# Patient Record
Sex: MALE | Race: BLACK OR AFRICAN AMERICAN | NOT HISPANIC OR LATINO | Employment: OTHER | URBAN - METROPOLITAN AREA
[De-identification: names, ages, dates, MRNs, and addresses within clinical notes are randomized per-mention and may not be internally consistent; named-entity substitution may affect disease eponyms.]

---

## 2018-02-08 ENCOUNTER — APPOINTMENT (OUTPATIENT)
Dept: CT IMAGING | Facility: CLINIC | Age: 73
End: 2018-02-08
Attending: EMERGENCY MEDICINE
Payer: COMMERCIAL

## 2018-02-08 ENCOUNTER — APPOINTMENT (OUTPATIENT)
Dept: GENERAL RADIOLOGY | Facility: CLINIC | Age: 73
End: 2018-02-08
Attending: EMERGENCY MEDICINE
Payer: COMMERCIAL

## 2018-02-08 ENCOUNTER — HOSPITAL ENCOUNTER (OUTPATIENT)
Facility: CLINIC | Age: 73
Setting detail: OBSERVATION
Discharge: HOME | End: 2018-02-09
Attending: EMERGENCY MEDICINE | Admitting: FAMILY MEDICINE
Payer: COMMERCIAL

## 2018-02-08 DIAGNOSIS — R79.89 ELEVATED LACTIC ACID LEVEL: ICD-10-CM

## 2018-02-08 DIAGNOSIS — R93.1 REGIONAL WALL MOTION ABNORMALITY OF HEART: ICD-10-CM

## 2018-02-08 DIAGNOSIS — I50.9 CHF (CONGESTIVE HEART FAILURE) (H): ICD-10-CM

## 2018-02-08 DIAGNOSIS — R35.0 URINARY FREQUENCY: ICD-10-CM

## 2018-02-08 DIAGNOSIS — R79.89: ICD-10-CM

## 2018-02-08 DIAGNOSIS — M43.6 NECK STIFFNESS: ICD-10-CM

## 2018-02-08 DIAGNOSIS — N40.1 BENIGN PROSTATIC HYPERPLASIA WITH LOWER URINARY TRACT SYMPTOMS, SYMPTOM DETAILS UNSPECIFIED: ICD-10-CM

## 2018-02-08 DIAGNOSIS — N39.0 UTI (URINARY TRACT INFECTION): ICD-10-CM

## 2018-02-08 DIAGNOSIS — I50.32 CHRONIC DIASTOLIC CONGESTIVE HEART FAILURE (H): Primary | ICD-10-CM

## 2018-02-08 DIAGNOSIS — M43.6 CONTRACTURE OF NECK: ICD-10-CM

## 2018-02-08 DIAGNOSIS — I50.9 CONGESTIVE HEART FAILURE (H): ICD-10-CM

## 2018-02-08 LAB
ALBUMIN SERPL-MCNC: 3.1 G/DL (ref 3.4–5)
ALBUMIN UR-MCNC: NEGATIVE MG/DL
ALP SERPL-CCNC: 85 U/L (ref 40–150)
ALT SERPL W P-5'-P-CCNC: 30 U/L (ref 0–70)
ANION GAP SERPL CALCULATED.3IONS-SCNC: 7 MMOL/L (ref 3–14)
APPEARANCE UR: CLEAR
AST SERPL W P-5'-P-CCNC: ABNORMAL U/L (ref 0–45)
BASOPHILS # BLD AUTO: 0 10E9/L (ref 0–0.2)
BASOPHILS NFR BLD AUTO: 1.1 %
BILIRUB SERPL-MCNC: 0.8 MG/DL (ref 0.2–1.3)
BILIRUB UR QL STRIP: NEGATIVE
BUN SERPL-MCNC: 22 MG/DL (ref 7–30)
CALCIUM SERPL-MCNC: 8.9 MG/DL (ref 8.5–10.1)
CHLORIDE SERPL-SCNC: 109 MMOL/L (ref 94–109)
CO2 SERPL-SCNC: 26 MMOL/L (ref 20–32)
COLOR UR AUTO: NORMAL
CREAT SERPL-MCNC: 1.32 MG/DL (ref 0.66–1.25)
DIFFERENTIAL METHOD BLD: ABNORMAL
EOSINOPHIL # BLD AUTO: 0.1 10E9/L (ref 0–0.7)
EOSINOPHIL NFR BLD AUTO: 2.5 %
ERYTHROCYTE [DISTWIDTH] IN BLOOD BY AUTOMATED COUNT: 14 % (ref 10–15)
FLUAV+FLUBV AG SPEC QL: NEGATIVE
FLUAV+FLUBV AG SPEC QL: NEGATIVE
GFR SERPL CREATININE-BSD FRML MDRD: 53 ML/MIN/1.7M2
GLUCOSE SERPL-MCNC: 104 MG/DL (ref 70–99)
GLUCOSE UR STRIP-MCNC: NEGATIVE MG/DL
HCT VFR BLD AUTO: 38.9 % (ref 40–53)
HGB BLD-MCNC: 12.8 G/DL (ref 13.3–17.7)
HGB UR QL STRIP: NEGATIVE
IMM GRANULOCYTES # BLD: 0 10E9/L (ref 0–0.4)
IMM GRANULOCYTES NFR BLD: 0.3 %
KETONES UR STRIP-MCNC: NEGATIVE MG/DL
LACTATE BLD-SCNC: 3.2 MMOL/L (ref 0.7–2)
LEUKOCYTE ESTERASE UR QL STRIP: NEGATIVE
LYMPHOCYTES # BLD AUTO: 1.4 10E9/L (ref 0.8–5.3)
LYMPHOCYTES NFR BLD AUTO: 39.6 %
MCH RBC QN AUTO: 30.1 PG (ref 26.5–33)
MCHC RBC AUTO-ENTMCNC: 32.9 G/DL (ref 31.5–36.5)
MCV RBC AUTO: 92 FL (ref 78–100)
MONOCYTES # BLD AUTO: 0.5 10E9/L (ref 0–1.3)
MONOCYTES NFR BLD AUTO: 14.3 %
NEUTROPHILS # BLD AUTO: 1.5 10E9/L (ref 1.6–8.3)
NEUTROPHILS NFR BLD AUTO: 42.2 %
NITRATE UR QL: NEGATIVE
NRBC # BLD AUTO: 0 10*3/UL
NRBC BLD AUTO-RTO: 0 /100
PH UR STRIP: 5 PH (ref 5–7)
PLATELET # BLD AUTO: 157 10E9/L (ref 150–450)
POTASSIUM SERPL-SCNC: 4.8 MMOL/L (ref 3.4–5.3)
PROT SERPL-MCNC: 8.1 G/DL (ref 6.8–8.8)
RBC # BLD AUTO: 4.25 10E12/L (ref 4.4–5.9)
SODIUM SERPL-SCNC: 142 MMOL/L (ref 133–144)
SOURCE: NORMAL
SP GR UR STRIP: 1.01 (ref 1–1.03)
SPECIMEN SOURCE: NORMAL
UROBILINOGEN UR STRIP-MCNC: NORMAL MG/DL (ref 0–2)
WBC # BLD AUTO: 3.6 10E9/L (ref 4–11)

## 2018-02-08 PROCEDURE — 82945 GLUCOSE OTHER FLUID: CPT | Performed by: EMERGENCY MEDICINE

## 2018-02-08 PROCEDURE — 81003 URINALYSIS AUTO W/O SCOPE: CPT | Performed by: EMERGENCY MEDICINE

## 2018-02-08 PROCEDURE — 83605 ASSAY OF LACTIC ACID: CPT | Performed by: EMERGENCY MEDICINE

## 2018-02-08 PROCEDURE — 85025 COMPLETE CBC W/AUTO DIFF WBC: CPT | Performed by: EMERGENCY MEDICINE

## 2018-02-08 PROCEDURE — 99284 EMERGENCY DEPT VISIT MOD MDM: CPT | Mod: 25 | Performed by: EMERGENCY MEDICINE

## 2018-02-08 PROCEDURE — 87070 CULTURE OTHR SPECIMN AEROBIC: CPT | Performed by: EMERGENCY MEDICINE

## 2018-02-08 PROCEDURE — 87186 SC STD MICRODIL/AGAR DIL: CPT | Performed by: EMERGENCY MEDICINE

## 2018-02-08 PROCEDURE — 84157 ASSAY OF PROTEIN OTHER: CPT | Performed by: EMERGENCY MEDICINE

## 2018-02-08 PROCEDURE — 70450 CT HEAD/BRAIN W/O DYE: CPT

## 2018-02-08 PROCEDURE — 87077 CULTURE AEROBIC IDENTIFY: CPT | Performed by: EMERGENCY MEDICINE

## 2018-02-08 PROCEDURE — 25000128 H RX IP 250 OP 636: Performed by: EMERGENCY MEDICINE

## 2018-02-08 PROCEDURE — 25000132 ZZH RX MED GY IP 250 OP 250 PS 637: Performed by: EMERGENCY MEDICINE

## 2018-02-08 PROCEDURE — 99285 EMERGENCY DEPT VISIT HI MDM: CPT | Mod: 25 | Performed by: EMERGENCY MEDICINE

## 2018-02-08 PROCEDURE — 80053 COMPREHEN METABOLIC PANEL: CPT | Performed by: EMERGENCY MEDICINE

## 2018-02-08 PROCEDURE — 71046 X-RAY EXAM CHEST 2 VIEWS: CPT

## 2018-02-08 PROCEDURE — 62270 DX LMBR SPI PNXR: CPT | Mod: Z6 | Performed by: EMERGENCY MEDICINE

## 2018-02-08 PROCEDURE — 87205 SMEAR GRAM STAIN: CPT | Performed by: EMERGENCY MEDICINE

## 2018-02-08 PROCEDURE — 62270 DX LMBR SPI PNXR: CPT | Performed by: EMERGENCY MEDICINE

## 2018-02-08 PROCEDURE — 87804 INFLUENZA ASSAY W/OPTIC: CPT | Mod: 91 | Performed by: EMERGENCY MEDICINE

## 2018-02-08 RX ORDER — ACETAMINOPHEN 325 MG/1
650 TABLET ORAL ONCE
Status: COMPLETED | OUTPATIENT
Start: 2018-02-08 | End: 2018-02-08

## 2018-02-08 RX ORDER — LIDOCAINE HYDROCHLORIDE 10 MG/ML
INJECTION, SOLUTION EPIDURAL; INFILTRATION; INTRACAUDAL; PERINEURAL
Status: DISCONTINUED
Start: 2018-02-08 | End: 2018-02-08 | Stop reason: HOSPADM

## 2018-02-08 RX ORDER — AMPICILLIN 2 G/1
2 INJECTION, POWDER, FOR SOLUTION INTRAVENOUS ONCE
Status: COMPLETED | OUTPATIENT
Start: 2018-02-08 | End: 2018-02-09

## 2018-02-08 RX ADMIN — ACETAMINOPHEN 650 MG: 325 TABLET, FILM COATED ORAL at 20:50

## 2018-02-08 NOTE — IP AVS SNAPSHOT
MRN:[id number]                      After Visit Summary   2/8/2018    Farhad Godoy    MRN: [id number]           Thank you!     Thank you for choosing [facility] for your care. Our goal is always to provide you with excellent care. Hearing back from our patients is one way we can continue to improve our services. Please take a few minutes to complete the written survey that you may receive in the mail after you visit with us. Thank you!        Patient Information     Date Of Birth          1945        About your hospital stay     You were admitted on:  February 9, 2018 You last received care in the:  [address] [facility]    You were discharged on:  February 9, 2018        Reason for your hospital stay       You were admitted for concern of an infection causing your headache and some lab abnormalities. We checked for an infection and everything looked normal so you were okay to go home.                  Who to Call     For medical emergencies, please call 911.  For non-urgent questions about your medical care, please call your primary care provider or clinic, [phone]          Attending Provider     Provider Specialty    Carmelo Chung MD Emergency Medicine    [facility] Health[facility]am rodney MD Family Practice    Darline Laird DO Family Practice       Primary Care Provider Office Phone # Fax #    [facility] [phone]      After Care Instructions     Activity       Your activity upon discharge: activity as tolerated            Diet       Follow this diet upon discharge: Regular                  Follow-up Appointments     Adult [facility] Follow-up and recommended labs and tests       Follow up with primary care provider, [facility], within 3-4 days for hospital follow- up.     Appointments on [place] and/or [place] (with [facility] or [facility] provider or service). Call [phone] if you haven't heard regarding these appointments within  7 days of discharge.                  Additional Services     CARDIOLOGY EVAL ADULT REFERRAL       Please be aware that coverage of these services is subject to the terms and limitations of your health insurance plan.  Call member services at your health plan with any benefit or coverage questions.      Reason for referral: New wall motion abnormality (subtle inferoseptal hypokinesis) seen on echo in comparison to last echo in 2014.    Please bring the following to your appointment:  Any x-rays, CTs or MRIs which have been performed. Contact the facility where they were done to arrange for  prior to your scheduled appointment.    List of current medications  This referral request   Any documents/labs given to you for this referral    Additional information including address information can be found on the web at:  [place] [phone] [email or web address]/ldwudpms-afehmlm-dsztmariah  [place] [phone] [email or web address]://[email or web address]/[email or web address] [phone] [email or web address]/locations/[facility] [phone] [email or web address]/locations/[facility] [phone] https://[email or web address]/locations/[facility] [phone]  [email or web address]://[email or web address]/locations/[facility]/iwnzkhjf-ocaqdt-azcfikrnm-center  [place] [phone]  https://[email or web address]/locations/[facility]/xfjjnqjg-ngffbsvyk-dlggivzoB[facility] [phone]  [email or web address]://[email or web address]/locations/[facility]/clinics-and-surgery-center  [place] [phone]    [email or web address]                  Pending Results     Date and Time Order Name Status Description    2/9/2018 0959 Blood culture Preliminary     2/9/2018 0959 Blood culture Preliminary     2/9/2018 0543 EKG 12-lead, tracing only Preliminary      "2018 0543 Blood Morphology Pathologist Review In process     2018 2145 CSF Culture Aerobic Bacterial Preliminary             Statement of Approval     Ordered          18 1417  I have reviewed and agree with all the recommendations and orders detailed in this document.  EFFECTIVE NOW     Approved and electronically signed by:  Konrad Jon DO           18 1248  I have reviewed and agree with all the recommendations and orders detailed in this document.  EFFECTIVE NOW     Approved and electronically signed by:  Felicity Jauregui MD             Admission Information     Date & Time Provider Department Dept. Phone    2018 Darline Laird DO [address] [facility] [place] [phone]      Your Vitals Were     Blood Pressure Pulse Temperature Respirations Weight Pulse Oximetry    109/60 (BP Location: Right arm) 60 98.7  F (37.1  C) (Oral) 18 76.8 kg (169 lb 5 oz) 97%    BMI (Body Mass Index)                   23.61 kg/m2           MyChart Information     "[employer] lets you send messages to your doctor, view your test results, renew your prescriptions, schedule appointments and more. To sign up, go to [email or web address] . Click on \"Log in\" on the left side of the screen, which will take you to the Welcome page. Then click on \"Sign up Now\" on the right side of the page.     You will be asked to enter the access code listed below, as well as some personal information. Please follow the directions to create your username and password.     Your access code is: [email or web address]  Expires: 5/10/2018  1:51 PM     Your access code will  in 90 days. If you need help or a new code, please call your [place] clinic or [phone].        Care EveryWhere ID     This is your Care EveryWhere ID. This could be used by other organizations to access your [place] medical records  [id number]        Equal Access to Services     AKIN MOSLEY AH: Lauren Recio, emily james, sandra crowe " lyndsay maykimmy hickey'aan ah. So [facility] [phone].    ATENCIÓN: Si mariamla margarita, tiene a du disposición servicios gratuitos de asistencia lingüística. Joseluis al [phone].    We comply with applicable federal civil rights laws and [place] laws. We do not discriminate on the basis of race, color, national origin, age, disability, sex, sexual orientation, or gender identity.               Review of your medicines      CONTINUE these medicines which may have CHANGED, or have new prescriptions. If we are uncertain of the size of tablets/capsules you have at home, strength may be listed as something that might have changed.        Dose / Directions    furosemide 20 MG tablet   Commonly known as:  LASIX   This may have changed:  when to take this   Used for:  Chronic diastolic congestive heart failure (H)        Dose:  20 mg   Take 1 tablet (20 mg) by mouth daily   Quantity:  30 tablet   Refills:  1       tamsulosin 0.4 MG capsule   Commonly known as:  FLOMAX   This may have changed:  medication strength   Used for:  Benign prostatic hyperplasia with lower urinary tract symptoms, symptom details unspecified        Dose:  0.4 mg   Take 1 capsule (0.4 mg) by mouth daily   Quantity:  60 capsule   Refills:  0         CONTINUE these medicines which have NOT CHANGED        Dose / Directions    acetaminophen 325 MG tablet   Commonly known as:  TYLENOL   Used for:  Contracture of neck        Dose:  650 mg   Take 2 tablets (650 mg) by mouth every 6 hours as needed for mild pain or fever   Quantity:  30 tablet   Refills:  0       carvedilol 3.125 MG tablet   Commonly known as:  COREG   Used for:  Chronic diastolic congestive heart failure (H)        Dose:  3.125 mg   Take 1 tablet (3.125 mg) by mouth 2 times daily   Quantity:  60 tablet   Refills:  0       lisinopril 5 MG tablet   Commonly known as:  PRINIVIL/ZESTRIL   Used for:  Chronic hepatitis C (H), CHF (congestive heart failure) (H)        Dose:   5 mg   Take 5 mg by mouth daily   Refills:  0       polyethylene glycol 0.4%- propylene glycol 0.3% 0.4-0.3 % Soln ophthalmic solution   Commonly known as:  SYSTANE ULTRA        Dose:  1 drop   Place 1 drop into both eyes every hour as needed for dry eyes   Refills:  0       sildenafil 50 MG tablet   Commonly known as:  VIAGRA   Used for:  ED (erectile dysfunction)        Dose:  50 mg   Take 1 tablet (50 mg) by mouth daily as needed for erectile dysfunction   Quantity:  8 tablet   Refills:  1            Where to get your medicines      These medications were sent to [facility] - [place], [place] - [address]  [address] [facility] [address]     Phone:  [phone]     acetaminophen 325 MG tablet    carvedilol 3.125 MG tablet    furosemide 20 MG tablet    tamsulosin 0.4 MG capsule                Protect others around you: Learn how to safely use, store and throw away your medicines at www.disposemymeds.org.             Medication List: This is a list of all your medications and when to take them. Check marks below indicate your daily home schedule. Keep this list as a reference.      Medications           Morning Afternoon Evening Bedtime As Needed    acetaminophen 325 MG tablet   Commonly known as:  TYLENOL   Take 2 tablets (650 mg) by mouth every 6 hours as needed for mild pain or fever   Last time this was given:  650 mg on 2/9/2018  6:30 AM                                carvedilol 3.125 MG tablet   Commonly known as:  COREG   Take 1 tablet (3.125 mg) by mouth 2 times daily   Last time this was given:  3.125 mg on 2/9/2018  7:51 AM                                furosemide 20 MG tablet   Commonly known as:  LASIX   Take 1 tablet (20 mg) by mouth daily   Last time this was given:  20 mg on 2/9/2018  7:51 AM                                lisinopril 5 MG tablet   Commonly known as:  PRINIVIL/ZESTRIL   Take 5 mg by mouth daily                                polyethylene glycol 0.4%- propylene glycol 0.3%  0.4-0.3 % Soln ophthalmic solution   Commonly known as:  SYSTANE ULTRA   Place 1 drop into both eyes every hour as needed for dry eyes                                sildenafil 50 MG tablet   Commonly known as:  VIAGRA   Take 1 tablet (50 mg) by mouth daily as needed for erectile dysfunction                                tamsulosin 0.4 MG capsule   Commonly known as:  FLOMAX   Take 1 capsule (0.4 mg) by mouth daily   Last time this was given:  0.4 mg on 2/9/2018  7:51 AM

## 2018-02-08 NOTE — LETTER
Transition Communication Hand-off for Care Transitions to Next Level of Care Provider    Name: Farhad Godoy  MRN #: [id number]  Primary Care Provider: [facility]     Primary Clinic: [address] [place] [address]     Reason for Hospitalization:   Urinary frequency [R35.0]  Neck stiffness [M43.6]  Elevated lactic acid level [R79.89]    Admit Date/Time: 2/8/2018  8:05 PM  Discharge Date: 2/9/2018    Payor Source: Payor: UCARE / Plan: [employer] FOR S[employer]ORS [employer]ERS / Product Type: HMO /     Follow-up plan:  Future Appointments  Date Time Provider Department [place]   2/9/2018 2:30 PM Karin Eason [facility]   2/10/2018 11:45 AM Scooter Swanson [facility]   2/11/2018 12:00 PM Scooter Swanson [facility]   2/12/2018 10:00 AM Dori Dawn [facility]   2/13/2018 10:00 AM Dori Dawn [facility]       Key Recommendations:  Please review AVS    Vero DUNCANN RN PHN  Patient Care Management Coordinator  D[facility] [place] [address], and [facility] [address]  Phone: [phone] / Pager: [phone]      AVS/Discharge Summary is the source of truth; this is a helpful guide for improved communication of patient story

## 2018-02-08 NOTE — IP AVS SNAPSHOT
[address] [address]    Phone:  [phone]                                       After Visit Summary   2/8/2018    Farhad Godoy    MRN: [id number]           After Visit Summary Signature Page     I have received my discharge instructions, and my questions have been answered. I have discussed any challenges I see with this plan with the nurse or doctor.    ..........................................................................................................................................  Patient/Patient Representative Signature      ..........................................................................................................................................  Patient Representative Print Name and Relationship to Patient    ..................................................               ................................................  Date                                            Time    ..........................................................................................................................................  Reviewed by Signature/Title    ...................................................              ..............................................  Date                                                            Time

## 2018-02-08 NOTE — LETTER
February 15, 2018      Farhad Mooney  [address] [address]        Dear Farhad,     I am sorry we have not been able to reach you by phone. It is very important that you see a doctor because your spinal fluid test was positive for bacteria.     Your results need further followup. Please call the clinic to make an appointment to discuss your results. Burmese translation :pat-rich waca dhakhtachristina burnett jawaabtaada.      Resulted Orders   CSF Culture Aerobic Bacterial   Result Value Ref Range    Specimen Description Cerebrospinal fluid     Special Requests TUBE 3     Culture Micro (A)      On day 3, isolated in broth only:  Staphylococcus capitis      Culture Micro       Critical Value/Significant Value, preliminary result only, called to and read back by  Dr. Mirna Laird at 0840 2/12/18 SRQ     Blood Morphology Pathologist Review   Result Value Ref Range    Copath Report       Patient Name: FARHAD MOONEY  MR#: [id number]  Specimen #: [id number]  Collected: 2/9/2018  Received: 2/9/2018  Reported: 2/9/2018 16:30  Ordering Phy(s): DANTE UGALDE    For improved result formatting, select 'View Enhanced Report Format' under   Linked Documents section.    TEST(S):  Blood Smear Morphology    FINAL DIAGNOSIS:  Peripheral Blood Smear:  -Slight normochromic, normocytic anemia    I have personally reviewed all specimens and/or slides, including the   listed special stains, and used them  with my medical judgment to determine the final diagnosis.    Electronically signed out by:    Clarita Dent M.D.,[facility]    Technical testing/processing performed at [place], [place]    CLINICAL HISTORY:  From [facility] electronic medical record; 73-year-old male with a history of   congestive heart failure presented  with elevated lactic acid in the setting of headache and neck stiffness.    Peripheral smear review re  quested  for pancytopenia.    MICROSCOPIC DESCRIPTION:  The red blood cells appear normochromic.  Poikilocytosis is minimal.    Polychromasia is not increased.  Rouleaux formation is not increased.  Rare large platelets are seen.    (Dictated by: Shannon Cat 2/9/2018 12:46 PM)    CLINICAL LAB RESULTS:  Battery Order No. Lab Test Code Clinical Result Ref. Range Units Result   Date  Hemogram/Diff/PLT R04186 BU WBC Count 4.4 4.0-11.0 10e9/L 2/9/2018 06:34       RBC Count L 3.97 4.4-5.9 10e12/L 2/9/2018 06:34       Hemoglobin L 11.7 13.3-17.7 g/dL 2/9/2018 06:34       Hematocrit L 36.2 40.0-53.0 % 2/9/2018 06:34       MCV 91  fl 2/9/2018 06:34       MCH 29.5 26.5-33.0 pg 2/9/2018 06:34       MCHC 32.3 31.5-36.5 g/dL 2/9/2018 06:34       RDW 14.2 10.0-15.0 % 2/9/2018 06:34       Platelet Count 160 150-450 10e9/L 2/9/2018 06:34        SEE TEXT   2/9/2018 06:34       Text/Comments:  Automated Method       % Neutrophils 35.5  % 2/9/2018 06:34       % Lymphocytes 43.7  % 2/9/2018 06:34        % Monocytes 15.1  % 2/9/2018 06:34       % Eosinophils 4.8  % 2/9/2018 06:34       % Basophils 0.7  % 2/9/2018 06:34       % Immature Grans 0.2  % 2/9/2018 06:34       Nucleated RBCs 0 0 /100 2/9/2018 06:34       abs Neutrophils 1.6 1.6-8.3 10e9/L 2/9/2018 06:34       abs Lymphocytes 1.9 0.8-5.3 10e9/L 2/9/2018 06:34       abs Monocytes 0.7 0.0-1.3 10e9/L 2/9/2018 06:34       abs Eosinophils 0.2 0.0-0.7 10e9/L 2/9/2018 06:34       abs Basophils 0.0 0.0-0.2 10e9/L 2/9/2018 06:34       abs Imm Granulocytes 0.0 0-0.4 10e9/L 2/9/2018 06:34       abs NRBC 0.0   2/9/2018 06:34    Retic   Retic % 1.1 0.5-2.0 % 2/9/2018 06:34       Retic abs 45.3 25-95 10e9/L 2/9/2018 06:34    CPT Codes:  A: 77011-JOBPH    TESTING LAB LOCATION:  [facility], [address]   [id number] [phone]    COLLECTION SITE:  Client:  [facility],  [place]  Location:  [facility] (B)     Blood culture   Result Value Ref Range    Specimen Description Blood Left Hand     Culture Micro No growth    Blood culture   Result Value Ref Range    Specimen Description Blood Right Hand     Culture Micro No growth        If you have any questions, please call the clinic to make an appointment.    Sincerely,    Darline Laird, DO

## 2018-02-09 ENCOUNTER — APPOINTMENT (OUTPATIENT)
Dept: CARDIOLOGY | Facility: CLINIC | Age: 73
End: 2018-02-09
Payer: COMMERCIAL

## 2018-02-09 ENCOUNTER — OFFICE VISIT (OUTPATIENT)
Dept: INTERPRETER SERVICES | Facility: CLINIC | Age: 73
End: 2018-02-09
Payer: COMMERCIAL

## 2018-02-09 VITALS
SYSTOLIC BLOOD PRESSURE: 109 MMHG | DIASTOLIC BLOOD PRESSURE: 60 MMHG | HEART RATE: 60 BPM | OXYGEN SATURATION: 97 % | BODY MASS INDEX: 23.61 KG/M2 | WEIGHT: 169.31 LBS | TEMPERATURE: 98.7 F | RESPIRATION RATE: 18 BRPM

## 2018-02-09 PROBLEM — M54.2 NECK PAIN: Status: ACTIVE | Noted: 2018-02-09

## 2018-02-09 PROBLEM — R79.89 ELEVATED LACTIC ACID LEVEL: Status: ACTIVE | Noted: 2018-02-09

## 2018-02-09 PROBLEM — E87.20 LACTIC ACIDOSIS: Status: ACTIVE | Noted: 2018-02-09

## 2018-02-09 LAB
APPEARANCE CSF: ABNORMAL
BASOPHILS # BLD AUTO: 0 10E9/L (ref 0–0.2)
BASOPHILS NFR BLD AUTO: 0.7 %
COLOR CSF: COLORLESS
COPATH REPORT: NORMAL
CREAT SERPL-MCNC: 1.42 MG/DL (ref 0.66–1.25)
CRP SERPL-MCNC: 3.5 MG/L (ref 0–8)
DIFFERENTIAL METHOD BLD: ABNORMAL
EOSINOPHIL # BLD AUTO: 0.2 10E9/L (ref 0–0.7)
EOSINOPHIL NFR BLD AUTO: 4.8 %
EOSINOPHIL NFR CSF MANUAL: 3 %
ERYTHROCYTE [DISTWIDTH] IN BLOOD BY AUTOMATED COUNT: 14.2 % (ref 10–15)
GFR SERPL CREATININE-BSD FRML MDRD: 49 ML/MIN/1.7M2
GLUCOSE CSF-MCNC: 54 MG/DL (ref 40–70)
GRAM STN SPEC: NORMAL
HCT VFR BLD AUTO: 36.2 % (ref 40–53)
HGB BLD-MCNC: 11.7 G/DL (ref 13.3–17.7)
IMM GRANULOCYTES # BLD: 0 10E9/L (ref 0–0.4)
IMM GRANULOCYTES NFR BLD: 0.2 %
INTERPRETATION ECG - MUSE: NORMAL
LACTATE BLD-SCNC: 1 MMOL/L (ref 0.7–2)
LYMPH ABN NFR CSF MANUAL: 57 %
LYMPHOCYTES # BLD AUTO: 1.9 10E9/L (ref 0.8–5.3)
LYMPHOCYTES NFR BLD AUTO: 43.7 %
MCH RBC QN AUTO: 29.5 PG (ref 26.5–33)
MCHC RBC AUTO-ENTMCNC: 32.3 G/DL (ref 31.5–36.5)
MCV RBC AUTO: 91 FL (ref 78–100)
MONOCYTES # BLD AUTO: 0.7 10E9/L (ref 0–1.3)
MONOCYTES NFR BLD AUTO: 15.1 %
MONOS+MACROS NFR CSF MANUAL: 29 %
NEUTROPHILS # BLD AUTO: 1.6 10E9/L (ref 1.6–8.3)
NEUTROPHILS NFR BLD AUTO: 35.5 %
NEUTROPHILS NFR CSF MANUAL: 11 %
NRBC # BLD AUTO: 0 10*3/UL
NRBC BLD AUTO-RTO: 0 /100
NT-PROBNP SERPL-MCNC: 1314 PG/ML (ref 0–900)
PLATELET # BLD AUTO: 160 10E9/L (ref 150–450)
PROCALCITONIN SERPL-MCNC: <0.05 NG/ML
PROT CSF-MCNC: 47 MG/DL (ref 15–60)
RBC # BLD AUTO: 3.97 10E12/L (ref 4.4–5.9)
RBC # CSF MANUAL: 648 /UL (ref 0–2)
RETICS # AUTO: 45.3 10E9/L (ref 25–95)
RETICS/RBC NFR AUTO: 1.1 % (ref 0.5–2)
SPECIMEN SOURCE: NORMAL
TUBE # CSF: 4 #
WBC # BLD AUTO: 4.4 10E9/L (ref 4–11)
WBC # CSF MANUAL: 3 /UL (ref 0–5)

## 2018-02-09 PROCEDURE — 84145 PROCALCITONIN (PCT): CPT | Performed by: FAMILY MEDICINE

## 2018-02-09 PROCEDURE — G0378 HOSPITAL OBSERVATION PER HR: HCPCS

## 2018-02-09 PROCEDURE — 93005 ELECTROCARDIOGRAM TRACING: CPT

## 2018-02-09 PROCEDURE — 82565 ASSAY OF CREATININE: CPT | Performed by: EMERGENCY MEDICINE

## 2018-02-09 PROCEDURE — 87040 BLOOD CULTURE FOR BACTERIA: CPT | Performed by: STUDENT IN AN ORGANIZED HEALTH CARE EDUCATION/TRAINING PROGRAM

## 2018-02-09 PROCEDURE — 93306 TTE W/DOPPLER COMPLETE: CPT

## 2018-02-09 PROCEDURE — 25000128 H RX IP 250 OP 636: Performed by: EMERGENCY MEDICINE

## 2018-02-09 PROCEDURE — 36415 COLL VENOUS BLD VENIPUNCTURE: CPT | Performed by: STUDENT IN AN ORGANIZED HEALTH CARE EDUCATION/TRAINING PROGRAM

## 2018-02-09 PROCEDURE — 93010 ELECTROCARDIOGRAM REPORT: CPT | Performed by: INTERNAL MEDICINE

## 2018-02-09 PROCEDURE — 96365 THER/PROPH/DIAG IV INF INIT: CPT | Performed by: EMERGENCY MEDICINE

## 2018-02-09 PROCEDURE — 96375 TX/PRO/DX INJ NEW DRUG ADDON: CPT | Performed by: EMERGENCY MEDICINE

## 2018-02-09 PROCEDURE — 83880 ASSAY OF NATRIURETIC PEPTIDE: CPT | Performed by: FAMILY MEDICINE

## 2018-02-09 PROCEDURE — 36415 COLL VENOUS BLD VENIPUNCTURE: CPT | Performed by: FAMILY MEDICINE

## 2018-02-09 PROCEDURE — 86140 C-REACTIVE PROTEIN: CPT | Performed by: FAMILY MEDICINE

## 2018-02-09 PROCEDURE — T1013 SIGN LANG/ORAL INTERPRETER: HCPCS | Mod: U3

## 2018-02-09 PROCEDURE — 85045 AUTOMATED RETICULOCYTE COUNT: CPT | Performed by: FAMILY MEDICINE

## 2018-02-09 PROCEDURE — 93306 TTE W/DOPPLER COMPLETE: CPT | Mod: 26 | Performed by: INTERNAL MEDICINE

## 2018-02-09 PROCEDURE — 25000132 ZZH RX MED GY IP 250 OP 250 PS 637: Performed by: FAMILY MEDICINE

## 2018-02-09 PROCEDURE — 40000611 ZZHCL STATISTIC MORPHOLOGY W/INTERP HEMEPATH TC 85060: Performed by: FAMILY MEDICINE

## 2018-02-09 PROCEDURE — 40000275 ZZH STATISTIC RCP TIME EA 10 MIN

## 2018-02-09 PROCEDURE — 83605 ASSAY OF LACTIC ACID: CPT | Performed by: FAMILY MEDICINE

## 2018-02-09 PROCEDURE — 85025 COMPLETE CBC W/AUTO DIFF WBC: CPT | Performed by: FAMILY MEDICINE

## 2018-02-09 PROCEDURE — 96366 THER/PROPH/DIAG IV INF ADDON: CPT | Performed by: EMERGENCY MEDICINE

## 2018-02-09 PROCEDURE — 86140 C-REACTIVE PROTEIN: CPT | Performed by: STUDENT IN AN ORGANIZED HEALTH CARE EDUCATION/TRAINING PROGRAM

## 2018-02-09 RX ORDER — ACETAMINOPHEN 325 MG/1
650 TABLET ORAL EVERY 6 HOURS PRN
Qty: 30 TABLET | Refills: 0 | Status: SHIPPED | OUTPATIENT
Start: 2018-02-09

## 2018-02-09 RX ORDER — TAMSULOSIN HYDROCHLORIDE 0.4 MG/1
0.4 CAPSULE ORAL DAILY
Status: DISCONTINUED | OUTPATIENT
Start: 2018-02-09 | End: 2018-02-09 | Stop reason: HOSPADM

## 2018-02-09 RX ORDER — LISINOPRIL 5 MG/1
5 TABLET ORAL
Status: DISCONTINUED | OUTPATIENT
Start: 2018-02-09 | End: 2018-02-09 | Stop reason: HOSPADM

## 2018-02-09 RX ORDER — NALOXONE HYDROCHLORIDE 0.4 MG/ML
.1-.4 INJECTION, SOLUTION INTRAMUSCULAR; INTRAVENOUS; SUBCUTANEOUS
Status: DISCONTINUED | OUTPATIENT
Start: 2018-02-09 | End: 2018-02-09 | Stop reason: HOSPADM

## 2018-02-09 RX ORDER — ONDANSETRON 2 MG/ML
4 INJECTION INTRAMUSCULAR; INTRAVENOUS EVERY 6 HOURS PRN
Status: DISCONTINUED | OUTPATIENT
Start: 2018-02-09 | End: 2018-02-09 | Stop reason: HOSPADM

## 2018-02-09 RX ORDER — FUROSEMIDE 20 MG
20 TABLET ORAL 2 TIMES DAILY
Status: DISCONTINUED | OUTPATIENT
Start: 2018-02-09 | End: 2018-02-09 | Stop reason: HOSPADM

## 2018-02-09 RX ORDER — FUROSEMIDE 20 MG
20 TABLET ORAL DAILY
Qty: 30 TABLET | Refills: 1 | Status: SHIPPED | OUTPATIENT
Start: 2018-02-09 | End: 2018-02-09

## 2018-02-09 RX ORDER — ACETAMINOPHEN 325 MG/1
650 TABLET ORAL EVERY 6 HOURS PRN
Status: DISCONTINUED | OUTPATIENT
Start: 2018-02-09 | End: 2018-02-09 | Stop reason: HOSPADM

## 2018-02-09 RX ORDER — ONDANSETRON 4 MG/1
4 TABLET, ORALLY DISINTEGRATING ORAL EVERY 6 HOURS PRN
Status: DISCONTINUED | OUTPATIENT
Start: 2018-02-09 | End: 2018-02-09 | Stop reason: HOSPADM

## 2018-02-09 RX ORDER — FUROSEMIDE 20 MG
20 TABLET ORAL DAILY
Qty: 30 TABLET | Refills: 1 | Status: SHIPPED | OUTPATIENT
Start: 2018-02-09

## 2018-02-09 RX ORDER — CARVEDILOL 3.12 MG/1
3.12 TABLET ORAL 2 TIMES DAILY
Status: DISCONTINUED | OUTPATIENT
Start: 2018-02-09 | End: 2018-02-09 | Stop reason: HOSPADM

## 2018-02-09 RX ORDER — TAMSULOSIN HYDROCHLORIDE 0.4 MG/1
0.4 CAPSULE ORAL DAILY
Qty: 60 CAPSULE | Refills: 0 | Status: SHIPPED | OUTPATIENT
Start: 2018-02-09 | End: 2019-08-14

## 2018-02-09 RX ORDER — CARVEDILOL 3.12 MG/1
3.12 TABLET ORAL 2 TIMES DAILY
Qty: 60 TABLET | Refills: 0 | Status: SHIPPED | OUTPATIENT
Start: 2018-02-09

## 2018-02-09 RX ADMIN — CARVEDILOL 3.12 MG: 3.12 TABLET, FILM COATED ORAL at 07:51

## 2018-02-09 RX ADMIN — CEFTRIAXONE 2 G: 2 INJECTION, POWDER, FOR SOLUTION INTRAMUSCULAR; INTRAVENOUS at 00:01

## 2018-02-09 RX ADMIN — TAMSULOSIN HYDROCHLORIDE 0.4 MG: 0.4 CAPSULE ORAL at 07:51

## 2018-02-09 RX ADMIN — VANCOMYCIN HYDROCHLORIDE 1250 MG: 10 INJECTION, POWDER, LYOPHILIZED, FOR SOLUTION INTRAVENOUS at 00:30

## 2018-02-09 RX ADMIN — SODIUM CHLORIDE 500 ML: 9 INJECTION, SOLUTION INTRAVENOUS at 00:00

## 2018-02-09 RX ADMIN — AMPICILLIN SODIUM 2 G: 2 INJECTION, POWDER, FOR SOLUTION INTRAMUSCULAR; INTRAVENOUS at 00:10

## 2018-02-09 RX ADMIN — FUROSEMIDE 20 MG: 20 TABLET ORAL at 07:51

## 2018-02-09 RX ADMIN — ACETAMINOPHEN 650 MG: 325 TABLET, FILM COATED ORAL at 06:30

## 2018-02-09 ASSESSMENT — ACTIVITIES OF DAILY LIVING (ADL): ADLS_ACUITY_SCORE: 18

## 2018-02-09 NOTE — PROGRESS NOTES
Observation goals:    (1) no signs or symptoms of infection: No, tests still pending  (2) finish infection rule out: No    Continue to monitor.

## 2018-02-09 NOTE — DISCHARGE SUMMARY
[facility]  Discharge Summary    Farhad Godoy MRN# [id number]   Age: 73 year old YOB: 1945     Date of Admission:  2/8/2018  Date of Discharge:  2/9/2018  Admitting Physician:  Darline Laird DO  Discharge Physician:  Darline Laird DO   Contact: [phone]  Discharging Service:  [facility]     Primary Provider:   [facility] / [employer] [address]  [phone]          Discharge Disposition:   Discharged to home           Condition on Discharge:   Discharge condition: Stable   Code status on discharge: Full Code          Admission Diagnoses:   Urinary frequency [R35.0]  Neck stiffness [M43.6]  Elevated lactic acid level [R79.89]          Principle Discharge Problem:   Lactic acidosis, presumed to be secondary to dehydration from lasix BID         Additional Discharge Problems:     Patient Active Problem List    Diagnosis Date Noted     Lactic acidosis 02/09/2018     Priority: Medium     Neck pain 02/09/2018     Priority: Medium     Elevated lactic acid level 02/09/2018     Priority: Medium     CHF (congestive heart failure) (H) 11/17/2014     Priority: Medium     Sepsis (H) 11/07/2014     Priority: Medium     BPH (benign prostatic hypertrophy) with urinary obstruction 10/27/2014     Priority: Medium     Chronic hepatitis C (H) 10/15/2014     Priority: Medium     Urinary frequency 08/07/2012     Priority: Medium     Multiple rib fractures 08/07/2012     Priority: Medium     Pain in the chest 08/05/2012     Priority: Medium     Trauma 07/31/2012     Priority: Medium            Medications Prior to Admission:     Prescriptions Prior to Admission   Medication Sig Dispense Refill Last Dose     polyethylene glycol 0.4%- propylene glycol 0.3% (SYSTANE ULTRA) 0.4-0.3 % SOLN ophthalmic solution Place 1 drop into both eyes every hour as needed for dry eyes   4/11/2015 at Unknown time     sildenafil  (VIAGRA) 50 MG tablet Take 1 tablet (50 mg) by mouth daily as needed for erectile dysfunction 8 tablet 1 Past Week at Unknown time     furosemide (LASIX) 20 MG tablet Take 1 tablet (20 mg) by mouth 2 times daily 180 tablet 3 4/11/2015 at Unknown time     lisinopril (PRINIVIL,ZESTRIL) 5 MG tablet Take 5 mg by mouth daily   4/11/2015 at Unknown time     carvedilol (COREG) 3.125 MG tablet Take 1 tablet (3.125 mg) by mouth 2 times daily 60 tablet 0 4/11/2015 at Unknown time     acetaminophen (TYLENOL) 325 MG tablet Take 2 tablets (650 mg) by mouth every 6 hours as needed for mild pain or fever 30 tablet 0 Past Week at Unknown time     TAMSULOSIN HCL PO Take 0.4 mg by mouth daily   4/11/2015 at Unknown time            Discharge Medications:     No additional medications were prescribed on discharge.  The furosemide dosing was changed from BID to once daily.       Review of your medicines      CONTINUE these medicines which may have CHANGED, or have new prescriptions. If we are uncertain of the size of tablets/capsules you have at home, strength may be listed as something that might have changed.       Dose / Directions    furosemide 20 MG tablet   Commonly known as:  LASIX   This may have changed:  when to take this   Used for:  Chronic diastolic congestive heart failure (H)        Dose:  20 mg   Take 1 tablet (20 mg) by mouth daily   Quantity:  30 tablet   Refills:  1         CONTINUE these medicines which have NOT CHANGED       Dose / Directions    acetaminophen 325 MG tablet   Commonly known as:  TYLENOL   Used for:  UTI (urinary tract infection)        Dose:  650 mg   Take 2 tablets (650 mg) by mouth every 6 hours as needed for mild pain or fever   Quantity:  30 tablet   Refills:  0       carvedilol 3.125 MG tablet   Commonly known as:  COREG   Used for:  Congestive heart failure (H)        Dose:  3.125 mg   Take 1 tablet (3.125 mg) by mouth 2 times daily   Quantity:  60 tablet   Refills:  0       lisinopril 5 MG  tablet   Commonly known as:  PRINIVIL/ZESTRIL   Used for:  Chronic hepatitis C (H), CHF (congestive heart failure) (H)        Dose:  5 mg   Take 5 mg by mouth daily   Refills:  0       polyethylene glycol 0.4%- propylene glycol 0.3% 0.4-0.3 % Soln ophthalmic solution   Commonly known as:  SYSTANE ULTRA        Dose:  1 drop   Place 1 drop into both eyes every hour as needed for dry eyes   Refills:  0       sildenafil 50 MG tablet   Commonly known as:  VIAGRA   Used for:  ED (erectile dysfunction)        Dose:  50 mg   Take 1 tablet (50 mg) by mouth daily as needed for erectile dysfunction   Quantity:  8 tablet   Refills:  1       TAMSULOSIN HCL PO        Dose:  0.4 mg   Take 0.4 mg by mouth daily   Refills:  0            Where to get your medicines      These medications were sent to [facility] [address] [place], [place] - [address] AT [facility] [address]  [address] [facility] [address]     Phone:  [phone]      furosemide 20 MG tablet                  Discharge Instructions and Follow-Up:   Discharge diet: Regular   Discharge activity: Activity as tolerated   Discharge follow-up: Follow up with primary care provider in 3-5 days.  Follow up with cardiology next available appointment (due to new wall motion abnormality since 2014)   Outpatient therapy: None    Home Care agency: None    Supplies and equipment: None   Lines and drains: None    Wound care: None   Other instructions: None            Brief Admission History and Evaluation:     Farhad Godoy is a 73 year old male with a significant past medical history of CHF, HLD, HTN, Hypothyroid, HCV-infection, CKD, and BPH who admitted on February 8, 2018 from the ED with lactic acid elevation in the setting of headache and neck stiffness. His headache improved with acetaminophen and ER started him on empiric IV vancomycin+ceftriaxon+ Ampicillin for meningitis.  CT scan did not show any evidence of intracranial hemorrhage. Lumber  "puncture and CSF analysis was done which did not show evidence of infection. Patient was admitted for monitoring to r/o infectious cause.  CXR, U/A, pro-calcitonin were also normal with no leukocytosis. When retested today, his pro-calcitonin was down to 1.0.  No sign of infection, patient discharged with close follow up with PCP.           Hospital Course/Discharge Plan by Problem:     # Lactic acidosis: resolved  In the ED it was measured at 3.2, which is concerning for infection. Does not have SIRS criteria. He had an LP which was negative, a CXR which was negative, a normal UA, no elevated WBC.  Patient's repeat lactic acid was normal.  Procal also normal.  Elevated lactic acid thought to be 2/2 to dehydration from new lasix.  Plan to decrease lasix to 20mg daily instead of BID.     # Hx of symptomatic CHF  Last EKG and Echo available in 2015. Saw PCP on 2/7 with complaints of leg swelling and was sent home with 3 days of Lasix 40 mg qday.  EKG shows sinus merry with left Bundle branch. block. Similar to 2015.  Pro-BNP elevated, consistent with CHF.  Recommend continuing coreg and lisinopril on discharge.  Will decrease lasix to 20mg daily.  Repeat echo shows new wall motion abnormality not seen on echo in 2014. Unlikely to be acute as patient denies any current or recent episodes of chest pain or shortness of breath.    - Recommend outpatient cardiology follow up.     # Headache, likely tension headache: Resolved  # rule out meningitis - work up negative  While in ED he complained of a new onset of \"the worst headache I have ever had\" with a \"stiff neck that hurts to move\".  LP negative, head CT negative.  He received meningitis prophylactic doses of Rocephin, Vancomycin, and Ampicillin.  No leukocytosis.  Lactic acid was 3.2.  When retested here on the floor it was down to 1.0. His exam was significant for full ROM with tenderness to palpation over the left SCM, Left trapezius, and posterior midline " tenderness.  No nuchal rigidity on exam.  D/c the antibiotics and reassured patient that most likely was tension headache.        # Wall motion abnormality on echo  New wall motion abnormality (subtle inferoseptal hypokinesis) seen on echo in comparison to last echo in 2014.  No chest pain.  No shortness of breath.  No signs of acute ischemia.  Patient needs outpatient follow up with cardiology (referral placed).        Final Day of Progress before Discharge:         Interval History:  General:  feels better   Vitals: vitals signs have been stable   Intake/Output: stable intake and output   Nutrition: regular diet   Mental status: mental status unchanged   Respiratory: respiratory effort about the same   Cardiovascular no chest pain, no palpitations, blood pressure stable and heart rate stable   Renal: stable renal fuction   Neurologic: no focal deficits reported and no changes reported since previous exam   Medications: Decreased lasix to 20mg once daily    Interventions: No interventions performed since the last visit and    Consults: Outpatient cardiology appt recommended due to new wall motional abnormality             Physical Exam:  Vitals were reviewed  Temp: 97.4  F (36.3  C) Temp src: Oral BP: 138/70 Pulse: 60 Heart Rate: 66 Resp: 18 SpO2: 99 % O2 Device: None (Room air)    Constitutional:   awake, alert, cooperative, no apparent distress, and appears stated age   Eyes:   Lids and lashes normal, pupils equal, round and reactive to light, extra ocular muscles intact, sclera clear, conjunctiva normal   ENT:   Normocephalic, without obvious abnormality, atraumatic, sinuses nontender on palpation, external ears without lesions, oral pharynx with moist mucous membranes, tonsils without erythema or exudates, gums normal and good dentition.   Neck:   Supple, symmetrical, trachea midline, no adenopathy, thyroid symmetric, not enlarged and no tenderness, skin normal. No meningismus or nucha rigidity.    Hematologic  / Lymphatic:   no cervical lymphadenopathy   Lungs:   No increased work of breathing, good air exchange, clear to auscultation bilaterally, no crackles or wheezing   Cardiovascular:   Normal apical impulse, regular rate and rhythm, normal S1 and S2,and no murmur noted   Abdomen:   No scars, normal bowel sounds, soft, non-distended, non-tender, no masses palpated, no hepatosplenomegally   Musculoskeletal:    No nuchal rigidity.  Bilateral +1  lower extremity pitting edema present   Neurologic:   Awake, alert, oriented to name, place and time.  Cranial nerves II-XII are grossly intact.  Motor is 5 out of 5 bilaterally.  Cerebellar finger to nose, heel to shin intact.  Sensory is intact.  Babinski down going, Romberg negative, and gait is normal.   Neuropsychiatric:   General: normal, calm and normal eye contact   Skin:   no bruising or bleeding and normal skin color, texture, turgor. Except an area of thickened whitened,  skin tissue around the right big toe             Data:  All laboratory data reviewed  All cardiac studies reviewed by me.  All imaging studies reviewed by me.         Pending Results:   Blood culture, CSF culture      It was a pleasure to participate in the care of Farhad Godoy.  If you have questions about his care, please do not hesitate to contact the [facility] team via the hospital wallace on which we are stationed.      [facility]  [facility] [address] - [phone]    Attestation:  This patient has been seen and evaluated by me, Darline Laird on 2018.  I saw and discussed the case with the primary resident and the care team. I agree with the findings and plan in this note. I have reviewed today's vital signs, medications, laboratory results and imaging results.     Darline Laird DO  [facility]

## 2018-02-09 NOTE — PLAN OF CARE
Problem: Patient Care Overview  Goal: Plan of Care/Patient Progress Review  /78 (BP Location: Right arm)  Pulse 60  Temp 97  F (36.1  C) (Oral)  Resp 18  Wt 76.8 kg (169 lb 5 oz)  SpO2 100%  BMI 23.61 kg/m2    Neuro: Alert. Disoriented to place, time, and situation - per [place]  via telephone. Citizen of [place] speaking.   Cardiac: Sinus merry L BBB with HR 55 - 60. VSS. Afebrile.   Respiratory: O2 sats stable on RA.   GI/: Incontinent or urine. Urinary frequency.   Diet/appetite: Clear liquid diet.   Activity:  Ax 1.  Pain: At acceptable level on current regimen. PRN tylenol administered x1.    Skin: Intact, no new deficits noted.    R: Continue with POC. Notify primary team with changes.

## 2018-02-09 NOTE — ED PROVIDER NOTES
"  History     Chief Complaint   Patient presents with     Urinary Frequency     Per family pt is too \"take pills in morning\" but didn't take till this afternoon. Pt with frequent urination. Family member denies it was a water pill but pt is prescribed lasix. \"It's a pill for swelling in his legs\". He also feels feverish and all over ache.     Headache     C/O severe headache and no feeling right     HPI  Farhad Godoy is a 73 year old male w/ PMH including CHF, hepC, urinary frequency who presents with subjective fevers, body aches, and headache.  History from the patient via video [employer]  and the family.  They report that the patient had a clinic visit earlier this morning, and was prescribed furosemide for his leg swelling. He took this in the afternoon and soon after began having frequent urination.  Around the same time he also noted feeling of a fever, body aches, severe headache and neck ache.  Patient reports this is the worst headache of his life.  Headache onset was gradual.  Patient reports pain with any movement of his neck.  No cough, vomiting, diarrhea.  No abdominal pain, no chest pain.      This part of the document was transcribed by Claudette Sepulveda Medical Scribe.   I have reviewed the Medications, Allergies, Past Medical and Surgical History, and Social History in the [facility] system.  Past Medical History:   Diagnosis Date     BPH (benign prostatic hyperplasia)     s/p turp 10/2014     Gastro-oesophageal reflux disease      Hematuria      Hepatitis C      Hypertension      Hypothyroidism      Multiple rib fractures 08/01/2012       Past Surgical History:   Procedure Laterality Date     CYSTOSCOPY, TRANSURETHRAL RESECTION (TUR) PROSTATE, COMBINED N/A 10/27/2014    Procedure: COMBINED CYSTOSCOPY, TRANSURETHRAL RESECTION (TUR) PROSTATE;  Surgeon: Bryson Macias MD;  Location: [facility] OR     EYE SURGERY       ORTHOPEDIC SURGERY      left leg       Family History   Problem Relation Age of " Onset     Other - See Comments       parents  of unknown cause       Social History   Substance Use Topics     Smoking status: Never Smoker     Smokeless tobacco: Never Used     Alcohol use No       Review of Systems  A 10-system review of systems was completed with pertinent positives and negatives noted in the HPI, otherwise negative.     Physical Exam   BP: 152/68  Pulse: 80  Heart Rate: 75  Temp: 98.9  F (37.2  C)  Resp: 18  Weight: 78.2 kg (172 lb 8 oz)  SpO2: 98 %      Physical Exam  /60  Pulse 80  Temp 97.6  F (36.4  C) (Oral)  Resp 18  Wt 78.2 kg (172 lb 8 oz)  SpO2 95%  BMI 24.06 kg/m2  General: tired-appearing, elderly male  HENT: MMM, no oropharyngeal lesions  Eyes: PERRL, normal sclerae, no conjunctival pallor  Neck: tender paraspinal muscles, stiffness present, pain with active or passive ROM.  Cardio: RRR, normal heart sounds, extremities well perfused  Resp: Normal work of breathing, clear breath sounds  Chest/Back: no visual signs of trauma, mild bilateral CVA tenderness  Abdomen: mild suprapubic tenderness, non-distended, no rebound, no guarding  Neuro: Neuro: alert and fully oriented. No confusion. CN II-XII intact to testing. Strength left: 5/5 , 5/5 elbow flexion, 5/5 elbow extension, 5/5 shoulder abduction, 5/5 hip flexion, 5/5 knee flexion, 5/5 knee extension. Strength right: 5/5 , 5/5 elbow flexion, 5/5 elbow extension, 5/5 shoulder abduction, 5/5 hip flexion, 5/5 knee flexion, 5/5 knee extension. Sensation intact to soft touch in all extremities. No pronator drift. Normal tone, no clonus.   MSK: no deformities.   Integumentary/Skin: no rash, normal color. Palpably overly warm.   Psych: normal affect, normal behavior    ED Course     ED Course     Lumbar Puncture  Date/Time: 2018 12:09 AM  Performed by: FREEMAN DONALDSON  Authorized by: FREEMAN DONALDSON   Consent: Written consent obtained.  Risks and benefits: risks, benefits and alternatives were  "discussed  Consent given by: patient (with iPad [employer] )  Patient understanding: patient states understanding of the procedure being performed  Patient consent: the patient's understanding of the procedure matches consent given  Procedure consent: procedure consent matches procedure scheduled  Site marked: the operative site was marked  Imaging studies: imaging studies available  Required items: required blood products, implants, devices, and special equipment available  Patient identity confirmed: verbally with patient and arm band  Time out: Immediately prior to procedure a \"time out\" was called to verify the correct patient, procedure, equipment, support staff and site/side marked as required.  Indications: evaluation for infection  Anesthesia: local infiltration    Anesthesia:  Local Anesthetic: lidocaine 1% without epinephrine  Anesthetic total: 2 mL    Sedation:  Patient sedated: no  Preparation: Patient was prepped and draped in the usual sterile fashion.  Lumbar space: L3-L4 interspace  Patient's position: left lateral decubitus  Needle gauge: 20  Needle type: spinal needle - Quincke tip  Needle length: 3.5 in  Number of attempts: 3  Fluid appearance: blood-tinged then clearing  Tubes of fluid: 4  Total volume: 6 ml  Post-procedure: pressure dressing applied and adhesive bandage applied  Patient tolerance: Patient tolerated the procedure well with no immediate complications                Critical Care time:  None   Unclear cause of lactate elevation, but patient not meeting sepsis criteria due to lack of SIRS criteria.       Labs Ordered and Resulted from Time of ED Arrival Up to the Time of Departure from the ED   CBC WITH PLATELETS DIFFERENTIAL - Abnormal; Notable for the following:        Result Value    WBC 3.6 (*)     RBC Count 4.25 (*)     Hemoglobin 12.8 (*)     Hematocrit 38.9 (*)     Absolute Neutrophil 1.5 (*)     All other components within normal limits   COMPREHENSIVE METABOLIC PANEL " - Abnormal; Notable for the following:     Glucose 104 (*)     Creatinine 1.32 (*)     GFR Estimate 53 (*)     Albumin 3.1 (*)     All other components within normal limits   LACTIC ACID WHOLE BLOOD - Abnormal; Notable for the following:     Lactic Acid 3.2 (*)     All other components within normal limits   UA MACROSCOPIC WITH REFLEX TO MICRO AND CULTURE   CREATININE   ED NON INDWELLING BLADDER CATHETER   INFLUENZA A/B ANTIGEN   GLUCOSE CSF   PROTEIN TOTAL CSF   GRAM STAIN   CSF CULTURE AEROBIC BACTERIAL   CELL COUNT WITH DIFFERENTIAL CSF            Assessments & Plan (with Medical Decision Making)   In the ED, the patient was afebrile orally on arrival, but felt palpably warm to the touch, and hemodynamically stable. History notable for several hours of severe headache, neck ache, subjective fevers along with urinary frequency after taking furosemide. Exam notable for nuchal rigidity and pain with passive or active neck range of motion, no CVA tenderness, mild suprapubic tenderness without other abdominal tenderness present.      Acetaminophen given with some improvement in headache but continued severe neck pain.  CT head without acute findings, did show some chronic atrophy, no hemorrhage.  Chest x-ray without consolidation to suggest pneumonia.  Influenza swab negative. UA without evidence of UTI.  Serum lactate quite elevated at 3.2, raising suspicion of infection. Rectal temp was normal but 2 hours after APAP was given. Given lack of other etiologies to explain his subjective fevers and elevated lactate such as UTI, influenza, pneumonia there is concern for meningitis.     With [place] video  risk, benefits, indications, alternatives of LP were discussed.  Patient demonstrated understanding and capacity and consented to LP.  This procedure was performed as detailed above without complication. Vancomycin, ceftriaxone, ampicillin given empirically while awaiting CSF results. CSF glucose and protein  wnl, other CSF studies pending.      After counseling on the diagnosis, work-up, and treatment plan, the patient was admitted to [facility].        Clinical Impression:  Elevated lactate  Headache  Neck stiffness  Urinary frequency      Carmelo Chung MD  Emergency Medicine       I have reviewed the nursing notes.    I have reviewed the findings, diagnosis, plan and need for follow up with the patient.    New Prescriptions    No medications on file       Final diagnoses:   Elevated lactic acid level   Neck stiffness   Urinary frequency       2/8/2018   [facility], [place], EMERGENCY DEPARTMENT     Carmelo Chung MD  02/09/18 0045

## 2018-02-09 NOTE — PROGRESS NOTES
.DISCHARGE        1530                 2/9/18----------------------------------------------------------------------------  Discharged to: Home  Via: Automobile  Accompanied by: Family  Discharge Instructions: diet, activity, medications, follow up appointments (PCP and cardiology) reviewed.  Prescriptions: To be filled by \[facility]\"" pharmacy per pt's request; medication list reviewed & sent with pt and son using   Follow Up Appointments: arranged; information given  Belongings: All sent with pt  IV: out  Telemetry: off  Pt exhibits understanding of above discharge instructions; all questions answered.    Discharge Paperwork: Signed, copied, and sent home with patient.   *[facility] would like pt to f/u with PCP about lisinopril d/t possible [facility]EDRICK*

## 2018-02-09 NOTE — PROGRESS NOTES
Transfer  Transferred from: [facility]  Via: Stretcher  Reason for transfer: Pt appropriate for 6B  Family: Aware of transfer   Belongings: Received with pt      2 RN Skin Assessment Completed By: Zuleika Camejo and Laura Muñoz   Report received from: ALCIRA Soria   Pt status: Pt alert - disoriented to place, situation, and time (per  via telephone). VSS. O2 sats stable on RA. Afebrile. HR 55 - 60. MD paged and will come to bedside to assess and put in orders.

## 2018-02-09 NOTE — H&P
"                                               [facility]  Inpatient History and Physical    Farhad Godoy MRN# [id number]   Age: 73 year old   YOB: 1945   Date of admission: 2/8/2018  Date of service: 2/9/2018.    Home clinic: [facility]          Chief Concern:   Headache, neck stiffness, and excessive urination (s/p lasix)       History is obtained from the patient with his son acting as  and also providing information.         History of Present Illness (Resident / Clinician):   Farhad Godoy is a 73 year old male with a significant past medical history of congestive heart failure who presents from the ED with lactic Acid elevation in the setting of headache and neck stiffness. While in ED he complained of a new onset of \"the worst headache I have ever had\" with a \"stiff neck that hurts to move\". He got an LP which was negative, a head CT which was negative, and doses of Rocephin, Vancomycin, and Ampicillin. No WBC elevation in the setting of slight pancytopenia, but his lactic acid in the ED was 3.2. When retested here on the floor it was down to 1.0.   His labs were also concerning for possible FREDRICK, but his baseline is not well established: creatinine today is 1.42, and 1.32 yesterday. [facility] in 11/2017 was 1.78. [facility]: 5/2016 and 1/2016 are 1.39 and 1.30 respectively. So, based on this info I feel comfortable in asserting that he is close to his baseline. (GFR today 49, 53 yesterday, 37 in 11/2017, 50 in 5/2016, and 55 in 1/2016. So this is also fairly consistent with him being at baseline). We monitor him to make sure he is stable and evaluate his CHF and pancytopenia.    Old records were reviewed, and any additions to pertinent history are noted above.           Active Problem List :     Patient Active Problem List   Diagnosis     Trauma     Pain in the chest     Urinary frequency     Multiple rib fractures     Chronic hepatitis C (H)     " "BPH (benign prostatic hypertrophy) with urinary obstruction     Sepsis (H)     CHF (congestive heart failure) (H)     Lactic acidosis     Neck pain          Past Medical History:     Past Medical History:   Diagnosis Date     BPH (benign prostatic hyperplasia)     s/p turp 10/2014     Gastro-oesophageal reflux disease      Hematuria      Hepatitis C      Hypertension      Hypothyroidism      Multiple rib fractures 08/01/2012           Past Surgical History:     Past Surgical History:   Procedure Laterality Date     CYSTOSCOPY, TRANSURETHRAL RESECTION (TUR) PROSTATE, COMBINED N/A 10/27/2014    Procedure: COMBINED CYSTOSCOPY, TRANSURETHRAL RESECTION (TUR) PROSTATE;  Surgeon: Bryson Macias MD;  Location: [facility] OR     EYE SURGERY       ORTHOPEDIC SURGERY      left leg           Social History:   No tobacco use. No alcohol use. No illicit drug use       Family History:     Family History     Problem (# of Occurrences) Relation (Name,Age of Onset)    Other - See Comments (1) Unspecified      \"Everyone is healthy.\"         Immunizations:   Immunizations are up to date        Allergies:   Docusate; Percocet [oxycodone-acetaminophen]; and Senna          Medications:   No current facility-administered medications on file prior to encounter.   Current Outpatient Prescriptions on File Prior to Encounter:  polyethylene glycol 0.4%- propylene glycol 0.3% (SYSTANE ULTRA) 0.4-0.3 % SOLN ophthalmic solution Place 1 drop into both eyes every hour as needed for dry eyes   sildenafil (VIAGRA) 50 MG tablet Take 1 tablet (50 mg) by mouth daily as needed for erectile dysfunction   furosemide (LASIX) 20 MG tablet Take 1 tablet (20 mg) by mouth 2 times daily   lisinopril (PRINIVIL,ZESTRIL) 5 MG tablet Take 5 mg by mouth daily   carvedilol (COREG) 3.125 MG tablet Take 1 tablet (3.125 mg) by mouth 2 times daily   acetaminophen (TYLENOL) 325 MG tablet Take 2 tablets (650 mg) by mouth every 6 hours as needed for mild pain or fever "   TAMSULOSIN HCL PO Take 0.4 mg by mouth daily          Review of Systems:   CONSTITUTIONAL: no fatigue, no unexpected change in weight.  ENT: no ear problems, no mouth problems, no throat problems.  RESP: no significant cough, no shortness of breath.  CV: no chest pain, no palpitations, no new or worsening peripheral edema.  GI: no nausea, no vomiting, no constipation, no diarrhea.  EXT: Right great toe soreness. Large medial callous asymmetrical with opposite foot and callous top scraped off. Nail thickening noted on both feet. +1 pitting edema.   : no dysuria, no hematuria. Urinary frequency after Lasix.  NEURO: no weakness, no dizziness. Headache present.  PSYCHIATRIC: NEGATIVE for changes in mood or trouble with sleep.  LYMPH: no lymph node swelling            Physical Exam:   Vitals were reviewed  Temp: 97  F (36.1  C) Oral BP: 152/78 Pulse: 60 Heart Rate: 58 Resp: 18 SpO2: 100 % on RA  Constitutional:   awake, alert, cooperative, no apparent distress, and appears stated age   Eyes:   Lids and lashes normal, pupils equal, round and reactive to light, extra ocular muscles intact, sclera clear, conjunctiva normal   ENT:   Normocephalic, without obvious abnormality, atraumatic, sinuses nontender on palpation, external ears without lesions, oral pharynx with moist mucous membranes, tonsils without erythema or exudates, gums normal and good dentition.   Neck:   Symmetrical, trachea midline, no adenopathy, skin normal, slight tenderness; no nuchal rigidity or meningismus noted   Lungs:   No increased work of breathing, clear to auscultation bilaterally, no crackles or wheezing.   Cardiovascular:   Normal apical impulse, regular rate and rhythm, and no murmur noted. +1 pitting edema to below knee b/l LE.   Abdomen:   Normal bowel sounds, soft, non-distended, non-tender, no masses palpated.   Psychiatric:   General: normal, calm and normal eye contact. Level of consciousness: alert / normal. Affect: normal.    Skin:   Right big toe has an uncomfortable callous and nail thickening. Otherwise WNL.            Data:     Results for orders placed or performed during the hospital encounter of 02/08/18 (from the past 24 hour(s))   Influenza A/B antigen   Result Value Ref Range    Influenza A/B Agn Specimen Nares     Influenza A Negative NEG^Negative    Influenza B Negative NEG^Negative   CBC with platelets differential   Result Value Ref Range    WBC 3.6 (L) 4.0 - 11.0 10e9/L    RBC Count 4.25 (L) 4.4 - 5.9 10e12/L    Hemoglobin 12.8 (L) 13.3 - 17.7 g/dL    Hematocrit 38.9 (L) 40.0 - 53.0 %    MCV 92 78 - 100 fl    MCH 30.1 26.5 - 33.0 pg    MCHC 32.9 31.5 - 36.5 g/dL    RDW 14.0 10.0 - 15.0 %    Platelet Count 157 150 - 450 10e9/L    Diff Method Automated Method     % Neutrophils 42.2 %    % Lymphocytes 39.6 %    % Monocytes 14.3 %    % Eosinophils 2.5 %    % Basophils 1.1 %    % Immature Granulocytes 0.3 %    Nucleated RBCs 0 0 /100    Absolute Neutrophil 1.5 (L) 1.6 - 8.3 10e9/L    Absolute Lymphocytes 1.4 0.8 - 5.3 10e9/L    Absolute Monocytes 0.5 0.0 - 1.3 10e9/L    Absolute Eosinophils 0.1 0.0 - 0.7 10e9/L    Absolute Basophils 0.0 0.0 - 0.2 10e9/L    Abs Immature Granulocytes 0.0 0 - 0.4 10e9/L    Absolute Nucleated RBC 0.0    Comprehensive metabolic panel   Result Value Ref Range    Sodium 142 133 - 144 mmol/L    Potassium 4.8 3.4 - 5.3 mmol/L    Chloride 109 94 - 109 mmol/L    Carbon Dioxide 26 20 - 32 mmol/L    Anion Gap 7 3 - 14 mmol/L    Glucose 104 (H) 70 - 99 mg/dL    Urea Nitrogen 22 7 - 30 mg/dL    Creatinine 1.32 (H) 0.66 - 1.25 mg/dL    GFR Estimate 53 (L) >60 mL/min/1.7m2    GFR Estimate If Black 64 >60 mL/min/1.7m2    Calcium 8.9 8.5 - 10.1 mg/dL    Bilirubin Total 0.8 0.2 - 1.3 mg/dL    Albumin 3.1 (L) 3.4 - 5.0 g/dL    Protein Total 8.1 6.8 - 8.8 g/dL    Alkaline Phosphatase 85 40 - 150 U/L    ALT 30 0 - 70 U/L    AST Unsatisfactory specimen - hemolyzed 0 - 45 U/L   Lactic acid whole blood   Result Value  Ref Range    Lactic Acid 3.2 (H) 0.7 - 2.0 mmol/L   XR Chest 2 Views    Narrative    CHEST TWO VIEWS  2/8/2018  9:28 PM     COMPARISON: Two view chest x-ray 11/17/2014.    HISTORY: Cough.    FINDINGS: The cardiac silhouette, pulmonary vasculature, lungs and  pleural spaces are within normal limits.      Impression    IMPRESSION: Clear lungs.    NATHANAEL LORENZO MD   CT Head w/o Contrast    Narrative    CT OF THE HEAD WITHOUT CONTRAST  2/8/2018  9:42 PM     COMPARISON: Brain MR 4/11/2015.    HISTORY: Headache.    TECHNIQUE: 5 mm thick axial CT images of the head were acquired  without IV contrast material.    FINDINGS: There is mild diffuse cerebral volume loss. There are subtle  patchy areas of decreased density in the cerebral white matter  bilaterally that are consistent with sequela of chronic small vessel  ischemic disease.    The ventricles and basal cisterns are within normal limits in  configuration given the degree of cerebral volume loss. There is no  midline shift. There are no extra-axial fluid collections.    No intracranial hemorrhage, mass or recent infarct.    The visualized paranasal sinuses are well-aerated. There is no  mastoiditis. There are no fractures of the visualized bones.      Impression    IMPRESSION: Diffuse cerebral volume loss and cerebral white matter  changes consistent with chronic small vessel ischemic disease. No  evidence for acute intracranial pathology.        Radiation dose for this scan was reduced using automated exposure  control, adjustment of the mA and/or kV according to patient size, or  iterative reconstruction technique    NATHANAEL LORENZO MD   UA reflex to Microscopic and Culture   Result Value Ref Range    Color Urine Light Yellow     Appearance Urine Clear     Glucose Urine Negative NEG^Negative mg/dL    Bilirubin Urine Negative NEG^Negative    Ketones Urine Negative NEG^Negative mg/dL    Specific Gravity Urine 1.007 1.003 - 1.035    Blood Urine Negative NEG^Negative     pH Urine 5.0 5.0 - 7.0 pH    Protein Albumin Urine Negative NEG^Negative mg/dL    Urobilinogen mg/dL Normal 0.0 - 2.0 mg/dL    Nitrite Urine Negative NEG^Negative    Leukocyte Esterase Urine Negative NEG^Negative    Source Unspecified Urine    Glucose CSF: Tube 1   Result Value Ref Range    Glucose CSF 54 40 - 70 mg/dL   Protein total CSF: Tube 1   Result Value Ref Range    Protein Total CSF 47 15 - 60 mg/dL   Gram stain   Result Value Ref Range    Specimen Description Cerebrospinal fluid     Gram Stain No organisms seen     Gram Stain       Gram stain result is preliminary and awaits review of Microbiology Staff.    Gram Stain       RESULTS CALLED TO ALCIRA MATHUR ON 2/9/18 AT 0138 BY AK   CSF Culture Aerobic Bacterial   Result Value Ref Range    Specimen Description Cerebrospinal fluid     Special Requests TUBE 3     Culture Micro PENDING    Cell count with differential CSF: Tube 4   Result Value Ref Range    WBC CSF 3 0 - 5 /uL    RBC  (H) 0 - 2 /uL    % Neutrophils CSF 11 %    % Lymphocytes CSF 57 %    % Mono/Macros CSF 29 %    % Eosinophils CSF 3 %    Tube Number 4 #    Color CSF Colorless CLRL^Colorless    Appearance CSF Slightly Cloudy (A) CLER^Clear   Lumbar Puncture    Narrative    Freeman Donaldson MD     2/9/2018 12:45 AM  Lumbar Puncture  Date/Time: 2/9/2018 12:09 AM  Performed by: FREEMAN DONALDSON  Authorized by: FREEMAN DONALDSON   Consent: Written consent obtained.  Risks and benefits: risks, benefits and alternatives were discussed  Consent given by: patient (with iPad Palingen )  Patient understanding: patient states understanding of the procedure being   performed  Patient consent: the patient's understanding of the procedure matches   consent given  Procedure consent: procedure consent matches procedure scheduled  Site marked: the operative site was marked  Imaging studies: imaging studies available  Required items: required blood products, implants,  "devices, and special   equipment available  Patient identity confirmed: verbally with patient and arm band  Time out: Immediately prior to procedure a \"time out\" was called to verify   the correct patient, procedure, equipment, support staff and site/side   marked as required.  Indications: evaluation for infection  Anesthesia: local infiltration    Anesthesia:  Local Anesthetic: lidocaine 1% without epinephrine  Anesthetic total: 2 mL    Sedation:  Patient sedated: no  Preparation: Patient was prepped and draped in the usual sterile fashion.  Lumbar space: L3-L4 interspace  Patient's position: left lateral decubitus  Needle gauge: 20  Needle type: spinal needle - Quincke tip  Needle length: 3.5 in  Number of attempts: 3  Fluid appearance: blood-tinged then clearing  Tubes of fluid: 4  Total volume: 6 ml  Post-procedure: pressure dressing applied and adhesive bandage applied  Patient tolerance: Patient tolerated the procedure well with no immediate   complications     Creatinine   Result Value Ref Range    Creatinine 1.42 (H) 0.66 - 1.25 mg/dL    GFR Estimate 49 (L) >60 mL/min/1.7m2    GFR Estimate If Black 59 (L) >60 mL/min/1.7m2   EKG 12-lead, tracing only   Result Value Ref Range    Interpretation ECG Click View Image link to view waveform and result    CBC with platelets differential   Result Value Ref Range    WBC 4.4 4.0 - 11.0 10e9/L    RBC Count 3.97 (L) 4.4 - 5.9 10e12/L    Hemoglobin 11.7 (L) 13.3 - 17.7 g/dL    Hematocrit 36.2 (L) 40.0 - 53.0 %    MCV 91 78 - 100 fl    MCH 29.5 26.5 - 33.0 pg    MCHC 32.3 31.5 - 36.5 g/dL    RDW 14.2 10.0 - 15.0 %    Platelet Count 160 150 - 450 10e9/L    Diff Method Automated Method     % Neutrophils 35.5 %    % Lymphocytes 43.7 %    % Monocytes 15.1 %    % Eosinophils 4.8 %    % Basophils 0.7 %    % Immature Granulocytes 0.2 %    Nucleated RBCs 0 0 /100    Absolute Neutrophil 1.6 1.6 - 8.3 10e9/L    Absolute Lymphocytes 1.9 0.8 - 5.3 10e9/L    Absolute Monocytes 0.7 0.0 " "- 1.3 10e9/L    Absolute Eosinophils 0.2 0.0 - 0.7 10e9/L    Absolute Basophils 0.0 0.0 - 0.2 10e9/L    Abs Immature Granulocytes 0.0 0 - 0.4 10e9/L    Absolute Nucleated RBC 0.0    Reticulocyte Count   Result Value Ref Range    % Retic 1.1 0.5 - 2.0 %    Absolute Retic 45.3 25 - 95 10e9/L   Lactic acid whole blood   Result Value Ref Range    Lactic Acid 1.0 0.7 - 2.0 mmol/L   NT proBNP inpatient and ED   Result Value Ref Range    N-Terminal Pro BNP Inpatient 1314 (H) 0 - 900 pg/mL        Assessment and Plan:   Assessment:   Farhad Godoy is a 73 year old Faro[place] male with a significant past medical history of congestive heart failure who presents with Lactic acid elevation in the setting of headache and neck pain.  Patient Active Problem List   Diagnosis     Trauma     Pain in the chest     Urinary frequency     Multiple rib fractures     Chronic hepatitis C (H)     BPH (benign prostatic hypertrophy) with urinary obstruction     Sepsis (H)     CHF (congestive heart failure) (H)     Lactic acidosis     Neck pain       Plan:   # Lactic acid elevation: resolves  In the ED it was measured at 3.2, which is concerning for infection. He had an LP which was negative, a CXR which was negative, a normal UA, no elevated WBC.  Patient's repeat lactic acid was normal.  - Procal ordered.   - Lactic Acid retested at 1.0 (normal).    # Hx of symptomatic CHF  Last EKG and Echo available in 2015. Saw PCP on 2/7 with complaints of leg swelling and was sent home with 3 days of Lasix 40 mg qday.  - EKG shows sinus merry with left Bundle branch block. Similar to 2015.  - Echo-- ordered.  - Pro Bnp-- ordered.  - Continued PTA Coreg.  - Continued PTA Lasix.  - Hold PTA Lisinopril.    # Headache with neck tenderness on left side: improving  While in ED he complained of a new onset of \"the worst headache I have ever had\" with a \"stiff neck that hurts to move\". He got an LP which was negative, a head CT which was negative, and meningitis " prophylactic doses of Rocephin, Vancomycin, and Ampicillin. No WBC elevation in the setting of slight pancytopenia, but his lactic acid in the ED was 3.2. When retested here on the floor it was down to 1.0. His exam was significant for full ROM with tenderness to palpation over the left SCM, Left trapezius, and posterior midline tenderness.  No stiffness on exam.  Differential includes tension HA (especially since feeling better after tylenol) vs musculoskeletal problem vs less likely fracture as no hx of head trauma.    # Possible FREDRICK: stable  Baseline is not well established: today creatinine is 1.42, but last values that we have for him is a 1.32 yesterday. [facility] in 11/2017 was 1.78. [facility]: 5/2016 and 1/2016 are 1.39 and 1.30 respectively. He is close to his baseline. (GFR today 49, 53 yesterday, 37 in 11/2017, 50 in 5/2016, and 55 in 1/2016. So this is also fairly consistent with him being at baseline.)    - PO fluids      # Pancytopenia  - Ordered peripheral smear.  - Reticulocyte count: normal.    # Right great toe pain  - Will further evaluate    Daily cares -   F: PO  E: Stable  N: Normal Full  Lines: Saline-lock PIV  Activity: Up as tolerated  CODE: Full Code  Prophylaxis: ambulation  PCP communication:  - [place], [facility]  - Contacted at admission: No  Dispo: Expected Discharge Date: 02/09/18     Discussed with faculty but not examined by faculty.    DO Jessica McnairWhite River Junction [facility]  [facility]    Contact: Please see provider sticky note

## 2018-02-12 ENCOUNTER — TELEPHONE (OUTPATIENT)
Dept: FAMILY MEDICINE | Facility: CLINIC | Age: 73
End: 2018-02-12

## 2018-02-13 NOTE — TELEPHONE ENCOUNTER
Patient recently admitted on [facility] at [facility] for severe headache and neck stiffness and lactic acidosis. LP was performed and cultures of CSF were completed.     Attending physician Dr. Laird received call regarding CSF culture growing gram pos cocci in clusters. Attempted to reach patient by phone multiple times using language line without answer. Unable to leave VM as phone keeps ringing. Attempted to call listed PCP clinic [facility] who report that patient is no longer a pt of their clinic.       Patient will need follow up for symptom check. It is also a possibility that this culture is a contaminant. Will follow up final culture result and attempt to contact patient once again tomorrow.       Mary Kate Jauregui MD  Family Medicine PGY2

## 2018-02-14 LAB
BACTERIA SPEC CULT: ABNORMAL
BACTERIA SPEC CULT: ABNORMAL
Lab: ABNORMAL
SPECIMEN SOURCE: ABNORMAL

## 2018-02-15 LAB
BACTERIA SPEC CULT: NO GROWTH
BACTERIA SPEC CULT: NO GROWTH
SPECIMEN SOURCE: NORMAL
SPECIMEN SOURCE: NORMAL

## 2018-02-15 NOTE — TELEPHONE ENCOUNTER
We have been unsuccessful at reaching patient via phone to discuss his abnormal CSF results (positive for staph capitis).  Letter sent to patient stating urgent need for evaluation.

## 2019-01-28 ENCOUNTER — RECORDS - HEALTHEAST (OUTPATIENT)
Dept: LAB | Facility: CLINIC | Age: 74
End: 2019-01-28

## 2019-01-29 ENCOUNTER — RECORDS - HEALTHEAST (OUTPATIENT)
Dept: LAB | Facility: CLINIC | Age: 74
End: 2019-01-29

## 2019-01-30 LAB
ANION GAP SERPL CALCULATED.3IONS-SCNC: 6 MMOL/L (ref 5–18)
AST SERPL W P-5'-P-CCNC: 40 U/L (ref 0–40)
BASOPHILS # BLD AUTO: 0.1 THOU/UL (ref 0–0.2)
BASOPHILS NFR BLD AUTO: 1 % (ref 0–2)
BUN SERPL-MCNC: 20 MG/DL (ref 8–28)
C REACTIVE PROTEIN LHE: 1.3 MG/DL (ref 0–0.8)
CALCIUM SERPL-MCNC: 8.3 MG/DL (ref 8.5–10.5)
CHLORIDE BLD-SCNC: 105 MMOL/L (ref 98–107)
CO2 SERPL-SCNC: 26 MMOL/L (ref 22–31)
CREAT SERPL-MCNC: 1.22 MG/DL (ref 0.7–1.3)
EOSINOPHIL # BLD AUTO: 0.4 THOU/UL (ref 0–0.4)
EOSINOPHIL NFR BLD AUTO: 6 % (ref 0–6)
ERYTHROCYTE [DISTWIDTH] IN BLOOD BY AUTOMATED COUNT: 14.6 % (ref 11–14.5)
GAMMA INTERFERON BACKGROUND BLD IA-ACNC: 0.14 IU/ML
GFR SERPL CREATININE-BSD FRML MDRD: 58 ML/MIN/1.73M2
GLUCOSE BLD-MCNC: 63 MG/DL (ref 70–125)
HCT VFR BLD AUTO: 31 % (ref 40–54)
HGB BLD-MCNC: 9.8 G/DL (ref 14–18)
LYMPHOCYTES # BLD AUTO: 2.2 THOU/UL (ref 0.8–4.4)
LYMPHOCYTES NFR BLD AUTO: 31 % (ref 20–40)
M TB IFN-G BLD-IMP: NEGATIVE
MCH RBC QN AUTO: 30.9 PG (ref 27–34)
MCHC RBC AUTO-ENTMCNC: 31.6 G/DL (ref 32–36)
MCV RBC AUTO: 98 FL (ref 80–100)
MITOGEN IGNF BCKGRD COR BLD-ACNC: 0.27 IU/ML
MITOGEN IGNF BCKGRD COR BLD-ACNC: 0.31 IU/ML
MONOCYTES # BLD AUTO: 0.9 THOU/UL (ref 0–0.9)
MONOCYTES NFR BLD AUTO: 12 % (ref 2–10)
NEUTROPHILS # BLD AUTO: 3.5 THOU/UL (ref 2–7.7)
NEUTROPHILS NFR BLD AUTO: 49 % (ref 50–70)
PLATELET # BLD AUTO: 212 THOU/UL (ref 140–440)
PMV BLD AUTO: 11.4 FL (ref 8.5–12.5)
POTASSIUM BLD-SCNC: 3.7 MMOL/L (ref 3.5–5)
QTF INTERPRETATION: NORMAL
QTF MITOGEN - NIL: >10 IU/ML
RBC # BLD AUTO: 3.17 MILL/UL (ref 4.4–6.2)
SODIUM SERPL-SCNC: 137 MMOL/L (ref 136–145)
WBC: 7.1 THOU/UL (ref 4–11)

## 2019-02-05 ENCOUNTER — RECORDS - HEALTHEAST (OUTPATIENT)
Dept: LAB | Facility: CLINIC | Age: 74
End: 2019-02-05

## 2019-02-06 LAB
AST SERPL W P-5'-P-CCNC: 60 U/L (ref 0–40)
BASOPHILS # BLD AUTO: 0.1 THOU/UL (ref 0–0.2)
BASOPHILS NFR BLD AUTO: 1 % (ref 0–2)
C REACTIVE PROTEIN LHE: 0.2 MG/DL (ref 0–0.8)
CREAT SERPL-MCNC: 1.22 MG/DL (ref 0.7–1.3)
EOSINOPHIL # BLD AUTO: 0.5 THOU/UL (ref 0–0.4)
EOSINOPHIL NFR BLD AUTO: 9 % (ref 0–6)
ERYTHROCYTE [DISTWIDTH] IN BLOOD BY AUTOMATED COUNT: 14.2 % (ref 11–14.5)
GFR SERPL CREATININE-BSD FRML MDRD: 58 ML/MIN/1.73M2
HCT VFR BLD AUTO: 34.5 % (ref 40–54)
HGB BLD-MCNC: 11 G/DL (ref 14–18)
LYMPHOCYTES # BLD AUTO: 2.5 THOU/UL (ref 0.8–4.4)
LYMPHOCYTES NFR BLD AUTO: 49 % (ref 20–40)
MCH RBC QN AUTO: 30.9 PG (ref 27–34)
MCHC RBC AUTO-ENTMCNC: 31.9 G/DL (ref 32–36)
MCV RBC AUTO: 97 FL (ref 80–100)
MONOCYTES # BLD AUTO: 0.8 THOU/UL (ref 0–0.9)
MONOCYTES NFR BLD AUTO: 16 % (ref 2–10)
NEUTROPHILS # BLD AUTO: 1.3 THOU/UL (ref 2–7.7)
NEUTROPHILS NFR BLD AUTO: 25 % (ref 50–70)
PLATELET # BLD AUTO: 220 THOU/UL (ref 140–440)
PMV BLD AUTO: 10.9 FL (ref 8.5–12.5)
RBC # BLD AUTO: 3.56 MILL/UL (ref 4.4–6.2)
WBC: 5.1 THOU/UL (ref 4–11)

## 2019-02-13 ENCOUNTER — RECORDS - HEALTHEAST (OUTPATIENT)
Dept: LAB | Facility: CLINIC | Age: 74
End: 2019-02-13

## 2019-02-13 LAB
AST SERPL W P-5'-P-CCNC: 56 U/L (ref 0–40)
BASOPHILS # BLD AUTO: 0.1 THOU/UL (ref 0–0.2)
BASOPHILS NFR BLD AUTO: 2 % (ref 0–2)
C REACTIVE PROTEIN LHE: 0.2 MG/DL (ref 0–0.8)
EOSINOPHIL COUNT (ABSOLUTE): 0.7 THOU/UL (ref 0–0.4)
EOSINOPHIL NFR BLD AUTO: 15 % (ref 0–6)
ERYTHROCYTE [DISTWIDTH] IN BLOOD BY AUTOMATED COUNT: 14 % (ref 11–14.5)
HCT VFR BLD AUTO: 33.9 % (ref 40–54)
HGB BLD-MCNC: 10.9 G/DL (ref 14–18)
LYMPHOCYTES # BLD AUTO: 2.2 THOU/UL (ref 0.8–4.4)
LYMPHOCYTES NFR BLD AUTO: 46 % (ref 20–40)
MCH RBC QN AUTO: 30.9 PG (ref 27–34)
MCHC RBC AUTO-ENTMCNC: 32.2 G/DL (ref 32–36)
MCV RBC AUTO: 96 FL (ref 80–100)
MONOCYTES # BLD AUTO: 0.7 THOU/UL (ref 0–0.9)
MONOCYTES NFR BLD AUTO: 15 % (ref 2–10)
PLAT MORPH BLD: ABNORMAL
PLATELET # BLD AUTO: 131 THOU/UL (ref 140–440)
PMV BLD AUTO: 11 FL (ref 8.5–12.5)
RBC # BLD AUTO: 3.53 MILL/UL (ref 4.4–6.2)
TOTAL NEUTROPHILS-ABS(DIFF): 1.1 THOU/UL (ref 2–7.7)
TOTAL NEUTROPHILS-REL(DIFF): 23 % (ref 50–70)
WBC: 4.7 THOU/UL (ref 4–11)

## 2019-02-14 ENCOUNTER — RECORDS - HEALTHEAST (OUTPATIENT)
Dept: LAB | Facility: CLINIC | Age: 74
End: 2019-02-14

## 2019-02-15 LAB
CREAT SERPL-MCNC: 1.29 MG/DL (ref 0.7–1.3)
GFR SERPL CREATININE-BSD FRML MDRD: 54 ML/MIN/1.73M2

## 2019-02-18 ENCOUNTER — RECORDS - HEALTHEAST (OUTPATIENT)
Dept: LAB | Facility: CLINIC | Age: 74
End: 2019-02-18

## 2019-02-18 LAB
BASOPHILS # BLD AUTO: 0.1 THOU/UL (ref 0–0.2)
BASOPHILS NFR BLD AUTO: 1 % (ref 0–2)
EOSINOPHIL # BLD AUTO: 0.6 THOU/UL (ref 0–0.4)
EOSINOPHIL NFR BLD AUTO: 12 % (ref 0–6)
ERYTHROCYTE [DISTWIDTH] IN BLOOD BY AUTOMATED COUNT: 13.9 % (ref 11–14.5)
HCT VFR BLD AUTO: 32.8 % (ref 40–54)
HGB BLD-MCNC: 10.2 G/DL (ref 14–18)
LYMPHOCYTES # BLD AUTO: 2.3 THOU/UL (ref 0.8–4.4)
LYMPHOCYTES NFR BLD AUTO: 49 % (ref 20–40)
MCH RBC QN AUTO: 30.4 PG (ref 27–34)
MCHC RBC AUTO-ENTMCNC: 31.1 G/DL (ref 32–36)
MCV RBC AUTO: 98 FL (ref 80–100)
MONOCYTES # BLD AUTO: 0.9 THOU/UL (ref 0–0.9)
MONOCYTES NFR BLD AUTO: 19 % (ref 2–10)
NEUTROPHILS # BLD AUTO: 0.9 THOU/UL (ref 2–7.7)
NEUTROPHILS NFR BLD AUTO: 19 % (ref 50–70)
PLATELET # BLD AUTO: 114 THOU/UL (ref 140–440)
PMV BLD AUTO: 11.2 FL (ref 8.5–12.5)
RBC # BLD AUTO: 3.36 MILL/UL (ref 4.4–6.2)
WBC: 4.7 THOU/UL (ref 4–11)

## 2019-02-19 ENCOUNTER — RECORDS - HEALTHEAST (OUTPATIENT)
Dept: LAB | Facility: CLINIC | Age: 74
End: 2019-02-19

## 2019-02-19 LAB
ANION GAP SERPL CALCULATED.3IONS-SCNC: 7 MMOL/L (ref 5–18)
BUN SERPL-MCNC: 25 MG/DL (ref 8–28)
CALCIUM SERPL-MCNC: 8 MG/DL (ref 8.5–10.5)
CHLORIDE BLD-SCNC: 109 MMOL/L (ref 98–107)
CO2 SERPL-SCNC: 22 MMOL/L (ref 22–31)
CREAT SERPL-MCNC: 1.28 MG/DL (ref 0.7–1.3)
GFR SERPL CREATININE-BSD FRML MDRD: 55 ML/MIN/1.73M2
GLUCOSE BLD-MCNC: 98 MG/DL (ref 70–125)
POTASSIUM BLD-SCNC: 4.5 MMOL/L (ref 3.5–5)
SODIUM SERPL-SCNC: 138 MMOL/L (ref 136–145)

## 2019-02-20 ENCOUNTER — RECORDS - HEALTHEAST (OUTPATIENT)
Dept: LAB | Facility: CLINIC | Age: 74
End: 2019-02-20

## 2019-02-20 LAB
ANION GAP SERPL CALCULATED.3IONS-SCNC: 6 MMOL/L (ref 5–18)
AST SERPL W P-5'-P-CCNC: 55 U/L (ref 0–40)
BASOPHILS # BLD AUTO: 0.1 THOU/UL (ref 0–0.2)
BASOPHILS NFR BLD AUTO: 1 % (ref 0–2)
BUN SERPL-MCNC: 21 MG/DL (ref 8–28)
C REACTIVE PROTEIN LHE: 0.2 MG/DL (ref 0–0.8)
CALCIUM SERPL-MCNC: 8.1 MG/DL (ref 8.5–10.5)
CHLORIDE BLD-SCNC: 109 MMOL/L (ref 98–107)
CO2 SERPL-SCNC: 24 MMOL/L (ref 22–31)
CREAT SERPL-MCNC: 1.53 MG/DL (ref 0.7–1.3)
EOSINOPHIL COUNT (ABSOLUTE): 1 THOU/UL (ref 0–0.4)
EOSINOPHIL NFR BLD AUTO: 18 % (ref 0–6)
ERYTHROCYTE [DISTWIDTH] IN BLOOD BY AUTOMATED COUNT: 14 % (ref 11–14.5)
GFR SERPL CREATININE-BSD FRML MDRD: 45 ML/MIN/1.73M2
GLUCOSE BLD-MCNC: 75 MG/DL (ref 70–125)
HCT VFR BLD AUTO: 32.6 % (ref 40–54)
HGB BLD-MCNC: 10.2 G/DL (ref 14–18)
LYMPHOCYTES # BLD AUTO: 2.7 THOU/UL (ref 0.8–4.4)
LYMPHOCYTES NFR BLD AUTO: 50 % (ref 20–40)
MCH RBC QN AUTO: 30.4 PG (ref 27–34)
MCHC RBC AUTO-ENTMCNC: 31.3 G/DL (ref 32–36)
MCV RBC AUTO: 97 FL (ref 80–100)
MONOCYTES # BLD AUTO: 0.5 THOU/UL (ref 0–0.9)
MONOCYTES NFR BLD AUTO: 9 % (ref 2–10)
PLAT MORPH BLD: ABNORMAL
PLATELET # BLD AUTO: 125 THOU/UL (ref 140–440)
PMV BLD AUTO: 11 FL (ref 8.5–12.5)
POTASSIUM BLD-SCNC: 4.7 MMOL/L (ref 3.5–5)
RBC # BLD AUTO: 3.35 MILL/UL (ref 4.4–6.2)
SODIUM SERPL-SCNC: 139 MMOL/L (ref 136–145)
TOTAL NEUTROPHILS-ABS(DIFF): 1.2 THOU/UL (ref 2–7.7)
TOTAL NEUTROPHILS-REL(DIFF): 23 % (ref 50–70)
WBC: 5.5 THOU/UL (ref 4–11)

## 2019-08-14 ENCOUNTER — HOSPITAL ENCOUNTER (EMERGENCY)
Facility: CLINIC | Age: 74
Discharge: HOME | End: 2019-08-15
Attending: EMERGENCY MEDICINE | Admitting: EMERGENCY MEDICINE
Payer: COMMERCIAL

## 2019-08-14 DIAGNOSIS — S00.81XA ABRASION OF FACE, INITIAL ENCOUNTER: ICD-10-CM

## 2019-08-14 DIAGNOSIS — Z23 NEED FOR PROPHYLACTIC VACCINATION AND INOCULATION AGAINST CHOLERA ALONE: ICD-10-CM

## 2019-08-14 DIAGNOSIS — S80.02XA CONTUSION OF LEFT KNEE, INITIAL ENCOUNTER: ICD-10-CM

## 2019-08-14 PROCEDURE — 99284 EMERGENCY DEPT VISIT MOD MDM: CPT | Mod: Z6 | Performed by: EMERGENCY MEDICINE

## 2019-08-14 PROCEDURE — 99285 EMERGENCY DEPT VISIT HI MDM: CPT | Mod: 25

## 2019-08-14 NOTE — ED AVS SNAPSHOT
[facility], [place], Emergency Department  [address]  [facility] [address]  Phone:  [phone]  Fax:  [phone]                                    Farhad Godoy   MRN: [id number]    Department:  [facility], Emergency Department   Date of Visit:  8/14/2019           After Visit Summary Signature Page    I have received my discharge instructions, and my questions have been answered. I have discussed any challenges I see with this plan with the nurse or doctor.    ..........................................................................................................................................  Patient/Patient Representative Signature      ..........................................................................................................................................  Patient Representative Print Name and Relationship to Patient    ..................................................               ................................................  Date                                   Time    ..........................................................................................................................................  Reviewed by Signature/Title    ...................................................              ..............................................  Date                                               Time          22EPIC Rev 08/18

## 2019-08-15 ENCOUNTER — APPOINTMENT (OUTPATIENT)
Dept: GENERAL RADIOLOGY | Facility: CLINIC | Age: 74
End: 2019-08-15
Attending: EMERGENCY MEDICINE
Payer: COMMERCIAL

## 2019-08-15 ENCOUNTER — APPOINTMENT (OUTPATIENT)
Dept: CT IMAGING | Facility: CLINIC | Age: 74
End: 2019-08-15
Attending: EMERGENCY MEDICINE
Payer: COMMERCIAL

## 2019-08-15 VITALS
DIASTOLIC BLOOD PRESSURE: 94 MMHG | WEIGHT: 170 LBS | RESPIRATION RATE: 18 BRPM | OXYGEN SATURATION: 98 % | BODY MASS INDEX: 23.71 KG/M2 | TEMPERATURE: 97.9 F | SYSTOLIC BLOOD PRESSURE: 135 MMHG

## 2019-08-15 PROCEDURE — 90714 TD VACC NO PRESV 7 YRS+ IM: CPT | Performed by: EMERGENCY MEDICINE

## 2019-08-15 PROCEDURE — 25000128 H RX IP 250 OP 636: Performed by: EMERGENCY MEDICINE

## 2019-08-15 PROCEDURE — 73562 X-RAY EXAM OF KNEE 3: CPT | Mod: LT

## 2019-08-15 PROCEDURE — 90471 IMMUNIZATION ADMIN: CPT

## 2019-08-15 PROCEDURE — 25000132 ZZH RX MED GY IP 250 OP 250 PS 637: Performed by: EMERGENCY MEDICINE

## 2019-08-15 PROCEDURE — 72125 CT NECK SPINE W/O DYE: CPT

## 2019-08-15 PROCEDURE — 70450 CT HEAD/BRAIN W/O DYE: CPT

## 2019-08-15 PROCEDURE — 70486 CT MAXILLOFACIAL W/O DYE: CPT

## 2019-08-15 RX ORDER — ACETAMINOPHEN 325 MG/1
650 TABLET ORAL ONCE
Status: COMPLETED | OUTPATIENT
Start: 2019-08-15 | End: 2019-08-15

## 2019-08-15 RX ADMIN — CLOSTRIDIUM TETANI TOXOID ANTIGEN (FORMALDEHYDE INACTIVATED) AND CORYNEBACTERIUM DIPHTHERIAE TOXOID ANTIGEN (FORMALDEHYDE INACTIVATED) 0.5 ML: 5; 2 INJECTION, SUSPENSION INTRAMUSCULAR at 01:25

## 2019-08-15 RX ADMIN — ACETAMINOPHEN 650 MG: 325 TABLET, FILM COATED ORAL at 01:24

## 2019-08-15 ASSESSMENT — ENCOUNTER SYMPTOMS
FEVER: 0
COLOR CHANGE: 0
NECK STIFFNESS: 0
SHORTNESS OF BREATH: 0
DIFFICULTY URINATING: 0
WOUND: 1
EYE REDNESS: 0
HEADACHES: 0
ARTHRALGIAS: 0
CONFUSION: 0
ABDOMINAL PAIN: 0

## 2019-08-15 NOTE — DISCHARGE INSTRUCTIONS
Take acetaminophen as needed for pain.  Ice painful areas for 20 minutes at a time, 3-4 times per day.  Place a towel between the ice bag and your skin.    Follow-up with your primary care provider in 1 week.

## 2019-08-15 NOTE — ED TRIAGE NOTES
"Pt. fell on sidewalk face first.  States was \"passed out for awhile.\"  Has large abrasion left side of face.  Also right knee pain.   "

## 2019-08-15 NOTE — ED PROVIDER NOTES
[facility] EMERGENCY DEPARTMENT ([facility])     August 15, 2019    History     Chief Complaint   Patient presents with     Fall     Facial Injury     Knee Pain     A  was used (HPI interpreted by family member, per his request. ).     Farhad Godoy is a 74 year old male with history notable for HTN and CHF who presents to the ED after a fall. Patient states he was walking outside when he tripped and fell landing on his face. He did sustain an abrasion to his R cheek and also complains of L knee pain. Fall was unwitnessed, and patient was able to walk back home afterwards. He is not taking anticoagulants.    Per [facility], patient's last tetanus was in .       PAST MEDICAL HISTORY  Past Medical History:   Diagnosis Date     BPH (benign prostatic hyperplasia)     s/p turp 10/2014     Gastro-oesophageal reflux disease      Hematuria      Hepatitis C      Hypertension      Hypothyroidism      Multiple rib fractures 2012     PAST SURGICAL HISTORY  Past Surgical History:   Procedure Laterality Date     CYSTOSCOPY, TRANSURETHRAL RESECTION (TUR) PROSTATE, COMBINED N/A 10/27/2014    Procedure: COMBINED CYSTOSCOPY, TRANSURETHRAL RESECTION (TUR) PROSTATE;  Surgeon: Bryson Macias MD;  Location: [facility] OR     EYE SURGERY       ORTHOPEDIC SURGERY      left leg     FAMILY HISTORY  Family History   Problem Relation Age of Onset     Other - See Comments Other         parents  of unknown cause     SOCIAL HISTORY  Social History     Tobacco Use     Smoking status: Never Smoker     Smokeless tobacco: Never Used   Substance Use Topics     Alcohol use: No     MEDICATIONS  No current facility-administered medications for this encounter.      Current Outpatient Medications   Medication     lisinopril (PRINIVIL,ZESTRIL) 5 MG tablet     acetaminophen (TYLENOL) 325 MG tablet     carvedilol (COREG) 3.125 MG tablet     furosemide (LASIX) 20 MG tablet     polyethylene glycol 0.4%- propylene  glycol 0.3% (SYSTANE ULTRA) 0.4-0.3 % SOLN ophthalmic solution     sildenafil (VIAGRA) 50 MG tablet     ALLERGIES  Allergies   Allergen Reactions     Morphine Hives and Nausea and Vomiting     Nsaids Other (See Comments)     ARF     Docusate Nausea and Vomiting and Hives     Taken at same time as percocet, pt unsure which one caused reaction     Percocet [Oxycodone-Acetaminophen] Nausea and Vomiting and Hives     Senna Nausea and Vomiting and Hives     Taken at same time as percocet, pt unsure which one caused reaction       I have reviewed the Medications, Allergies, Past Medical and Surgical History, and Social History in the Epic system.    Review of Systems   Constitutional: Negative for fever.   HENT: Negative for congestion.    Eyes: Negative for redness.   Respiratory: Negative for shortness of breath.    Cardiovascular: Negative for chest pain.   Gastrointestinal: Negative for abdominal pain.   Genitourinary: Negative for difficulty urinating.   Musculoskeletal: Negative for arthralgias and neck stiffness.        Positive for L knee pain.    Skin: Positive for wound (R cheek abrasion). Negative for color change.   Neurological: Negative for headaches.   Psychiatric/Behavioral: Negative for confusion.   All other systems reviewed and are negative.      Physical Exam   BP: 116/76  Heart Rate: 74  Temp: 97.2  F (36.2  C)  Resp: 16  Weight: 77.1 kg (170 lb)  SpO2: 96 %      Physical Exam   Constitutional: He appears well-developed and well-nourished. No distress.   HENT:   Head: Atraumatic.       Right Ear: External ear normal.   Left Ear: External ear normal.   Mouth/Throat: Oropharynx is clear and moist.   Eyes: Pupils are equal, round, and reactive to light. No scleral icterus.   Cardiovascular: Normal rate, regular rhythm, normal heart sounds and intact distal pulses.   Pulmonary/Chest: Effort normal and breath sounds normal. No respiratory distress.   Abdominal: Soft. Bowel sounds are normal. There is no  tenderness.   Musculoskeletal: He exhibits no edema.        Left knee: He exhibits normal range of motion. Tenderness found.   Neurological: He is alert. He has normal strength. Coordination normal. GCS eye subscore is 4. GCS verbal subscore is 5. GCS motor subscore is 6.   Skin: Skin is warm. No rash noted. He is not diaphoretic.   Nursing note and vitals reviewed.      ED Course        Procedures   12:41 AM  The patient was seen and examined by Dr. Preciado in Room 7.            Results for orders placed or performed during the hospital encounter of 08/14/19   CT Head w/o Contrast    Narrative    EXAM: CT HEAD W/O CONTRAST, CT FACIAL BONES WITHOUT CONTRAST, CT CERVICAL SPINE W/O CONTRAST  LOCATION: [facility]  DATE/TIME: 8/15/2019 1:26 AM    INDICATION: Trauma. Head, neck and face injury.  COMPARISON: Head CT 2/8/2018, cervical spine 12/16/2012.  TECHNIQUE:   HEAD CT: Routine without IV contrast. Multiplanar reformats. Dose reduction techniques were used.  FACIAL BONE CT: Routine without IV contrast. Multiplanar reformats. Dose reduction techniques were used.  CERVICAL SPINE CT: Routine without IV contrast. Multiplanar reformats. Dose reduction techniques were used.    FINDINGS:  HEAD CT:   INTRACRANIAL CONTENTS: No intracranial hemorrhage, extraaxial collection, or mass effect. No CT evidence of acute infarct. Mild to moderate presumed chronic small vessel ischemic changes. Mild to moderate generalized volume loss. No hydrocephalus.     OSSEOUS STRUCTURES/SOFT TISSUES: No significant abnormality.     FACIAL BONE CT:  OSSEOUS STRUCTURES/SOFT TISSUES: Soft tissue swelling over the right hemimandible and maxilla. No facial bone fracture or malalignment. Multifocal dental decay.    ORBITAL CONTENTS: No acute abnormality.    SINUSES: No paranasal sinus mucosal disease.    CERVICAL SPINE CT:   VERTEBRA: Stable mild alterations in the lateral alignment. Cervical vertebra are normal in height. There is  interbody ankylosis at C3-C4 posteriorly and at C4-C5 and partial interbody ankylosis at C5-C6. No fracture or posttraumatic subluxation.     CANAL/FORAMINA: Advanced multilevel degenerative changes without osseous canal stenosis. There is high-grade foraminal narrowing on the right at C3-C4.    PARASPINAL: No extraspinal abnormality. Visualized lung fields are clear.      Impression    IMPRESSION:  HEAD CT:  1.  No acute intracranial hemorrhage or calvarial fracture.  2.  Mild to moderate age-related changes.    FACIAL BONE CT:  1.  No facial bone or mandibular fracture.    CERVICAL SPINE CT:  1.  No fracture or posttraumatic subluxation.  2.  Degenerative changes as highlighted above.   CT Cervical Spine w/o Contrast    Narrative    EXAM: CT HEAD W/O CONTRAST, CT FACIAL BONES WITHOUT CONTRAST, CT CERVICAL SPINE W/O CONTRAST  LOCATION: [facility]  DATE/TIME: 8/15/2019 1:26 AM    INDICATION: Trauma. Head, neck and face injury.  COMPARISON: Head CT 2/8/2018, cervical spine 12/16/2012.  TECHNIQUE:   HEAD CT: Routine without IV contrast. Multiplanar reformats. Dose reduction techniques were used.  FACIAL BONE CT: Routine without IV contrast. Multiplanar reformats. Dose reduction techniques were used.  CERVICAL SPINE CT: Routine without IV contrast. Multiplanar reformats. Dose reduction techniques were used.    FINDINGS:  HEAD CT:   INTRACRANIAL CONTENTS: No intracranial hemorrhage, extraaxial collection, or mass effect. No CT evidence of acute infarct. Mild to moderate presumed chronic small vessel ischemic changes. Mild to moderate generalized volume loss. No hydrocephalus.     OSSEOUS STRUCTURES/SOFT TISSUES: No significant abnormality.     FACIAL BONE CT:  OSSEOUS STRUCTURES/SOFT TISSUES: Soft tissue swelling over the right hemimandible and maxilla. No facial bone fracture or malalignment. Multifocal dental decay.    ORBITAL CONTENTS: No acute abnormality.    SINUSES: No paranasal sinus mucosal  disease.    CERVICAL SPINE CT:   VERTEBRA: Stable mild alterations in the lateral alignment. Cervical vertebra are normal in height. There is interbody ankylosis at C3-C4 posteriorly and at C4-C5 and partial interbody ankylosis at C5-C6. No fracture or posttraumatic subluxation.     CANAL/FORAMINA: Advanced multilevel degenerative changes without osseous canal stenosis. There is high-grade foraminal narrowing on the right at C3-C4.    PARASPINAL: No extraspinal abnormality. Visualized lung fields are clear.      Impression    IMPRESSION:  HEAD CT:  1.  No acute intracranial hemorrhage or calvarial fracture.  2.  Mild to moderate age-related changes.    FACIAL BONE CT:  1.  No facial bone or mandibular fracture.    CERVICAL SPINE CT:  1.  No fracture or posttraumatic subluxation.  2.  Degenerative changes as highlighted above.   CT Maxillofacial w/o Contrast    Narrative    EXAM: CT HEAD W/O CONTRAST, CT FACIAL BONES WITHOUT CONTRAST, CT CERVICAL SPINE W/O CONTRAST  LOCATION: [facility]  DATE/TIME: 8/15/2019 1:26 AM    INDICATION: Trauma. Head, neck and face injury.  COMPARISON: Head CT 2/8/2018, cervical spine 12/16/2012.  TECHNIQUE:   HEAD CT: Routine without IV contrast. Multiplanar reformats. Dose reduction techniques were used.  FACIAL BONE CT: Routine without IV contrast. Multiplanar reformats. Dose reduction techniques were used.  CERVICAL SPINE CT: Routine without IV contrast. Multiplanar reformats. Dose reduction techniques were used.    FINDINGS:  HEAD CT:   INTRACRANIAL CONTENTS: No intracranial hemorrhage, extraaxial collection, or mass effect. No CT evidence of acute infarct. Mild to moderate presumed chronic small vessel ischemic changes. Mild to moderate generalized volume loss. No hydrocephalus.     OSSEOUS STRUCTURES/SOFT TISSUES: No significant abnormality.     FACIAL BONE CT:  OSSEOUS STRUCTURES/SOFT TISSUES: Soft tissue swelling over the right hemimandible and maxilla. No facial bone  fracture or malalignment. Multifocal dental decay.    ORBITAL CONTENTS: No acute abnormality.    SINUSES: No paranasal sinus mucosal disease.    CERVICAL SPINE CT:   VERTEBRA: Stable mild alterations in the lateral alignment. Cervical vertebra are normal in height. There is interbody ankylosis at C3-C4 posteriorly and at C4-C5 and partial interbody ankylosis at C5-C6. No fracture or posttraumatic subluxation.     CANAL/FORAMINA: Advanced multilevel degenerative changes without osseous canal stenosis. There is high-grade foraminal narrowing on the right at C3-C4.    PARASPINAL: No extraspinal abnormality. Visualized lung fields are clear.      Impression    IMPRESSION:  HEAD CT:  1.  No acute intracranial hemorrhage or calvarial fracture.  2.  Mild to moderate age-related changes.    FACIAL BONE CT:  1.  No facial bone or mandibular fracture.    CERVICAL SPINE CT:  1.  No fracture or posttraumatic subluxation.  2.  Degenerative changes as highlighted above.   XR Knee Left 3 Views    Narrative    XR KNEE LT 3 VW  8/15/2019 2:27 AM     INDICATION: Pain, trauma.    COMPARISON: None.      Impression    IMPRESSION: No acute fracture or dislocation. Degenerative changes.  Left TKA. Degenerative and hypertrophic changes of the patella. Small  linear bone density along the proximal medial metaphysis of the tibia  does not appear acute. This may be postoperative or due to old trauma.    CARLA DUARTE MD          Critical Care time:             Labs Ordered and Resulted from Time of ED Arrival Up to the Time of Departure from the ED - No data to display         Assessments & Plan (with Medical Decision Making)   74 year old male with trip and fall on the sidewalk to the emerge department for evaluation of facial pain as well as left knee pain.  He is an abrasion over his right cheek.  He is not on anticoagulants.  In accordance with the [place] head CT rules, head CT was performed and reveals no acute intracranial  pathology.  His cervical spine does not have any fractures on CT.  Facial bone CT does not reveal any fractures either.  His left knee radiograph reveals no fracture.  The patient was able to ambulate with his cane.  He will be discharged home.  Acetaminophen recommended for pain.  Ice also recommended.  Suspect symptoms related to knee contusion as well as facial abrasion.  Discharge instructions for abrasion to include antibiotic ointment provided.    I have reviewed the nursing notes.    I have reviewed the findings, diagnosis, plan and need for follow up with the patient.    Discharge Medication List as of 8/15/2019  3:31 AM          Final diagnoses:   Abrasion of face, initial encounter   Contusion of left knee, initial encounter     Paulo BEAUCHAMP, ray serving as a trained medical scribe to document services personally performed by Jesús Preciado MD, based on the provider's statements to me.      Jesús BEAUCHAMP MD, was physically present and have reviewed and verified the accuracy of this note documented by Paulo Butts.     8/14/2019   [facility], [place], EMERGENCY DEPARTMENT     Jesús Preciado MD  08/15/19 [id number]

## 2023-01-10 ENCOUNTER — ANCILLARY ORDERS (OUTPATIENT)
Dept: GENERAL RADIOLOGY | Facility: CLINIC | Age: 78
End: 2023-01-10

## 2023-01-10 DIAGNOSIS — R22.9 LUMP OF SKIN: ICD-10-CM

## 2023-01-10 DIAGNOSIS — M25.522 LEFT ELBOW PAIN: ICD-10-CM

## 2023-01-24 ENCOUNTER — HOSPITAL ENCOUNTER (OUTPATIENT)
Dept: GENERAL RADIOLOGY | Facility: CLINIC | Age: 78
Discharge: HOME | End: 2023-01-24
Attending: NURSE PRACTITIONER | Admitting: NURSE PRACTITIONER
Payer: COMMERCIAL

## 2023-01-24 DIAGNOSIS — M25.522 LEFT ELBOW PAIN: ICD-10-CM

## 2023-01-24 DIAGNOSIS — R22.9 LUMP OF SKIN: ICD-10-CM

## 2023-01-24 PROCEDURE — 73070 X-RAY EXAM OF ELBOW: CPT | Mod: LT
